# Patient Record
Sex: MALE | Race: WHITE | NOT HISPANIC OR LATINO | Employment: UNEMPLOYED | URBAN - METROPOLITAN AREA
[De-identification: names, ages, dates, MRNs, and addresses within clinical notes are randomized per-mention and may not be internally consistent; named-entity substitution may affect disease eponyms.]

---

## 2023-08-16 ENCOUNTER — APPOINTMENT (INPATIENT)
Dept: NON INVASIVE DIAGNOSTICS | Facility: HOSPITAL | Age: 73
End: 2023-08-16
Payer: COMMERCIAL

## 2023-08-16 ENCOUNTER — APPOINTMENT (EMERGENCY)
Dept: RADIOLOGY | Facility: HOSPITAL | Age: 73
End: 2023-08-16
Payer: COMMERCIAL

## 2023-08-16 ENCOUNTER — HOSPITAL ENCOUNTER (INPATIENT)
Facility: HOSPITAL | Age: 73
LOS: 3 days | Discharge: HOME | End: 2023-08-19
Attending: EMERGENCY MEDICINE | Admitting: INTERNAL MEDICINE
Payer: COMMERCIAL

## 2023-08-16 DIAGNOSIS — I50.9 CHF (CONGESTIVE HEART FAILURE) (HCC): ICD-10-CM

## 2023-08-16 DIAGNOSIS — R09.02 HYPOXIA: Primary | ICD-10-CM

## 2023-08-16 PROBLEM — I10 HTN (HYPERTENSION): Status: ACTIVE | Noted: 2023-08-16

## 2023-08-16 PROBLEM — R77.8 ELEVATED TROPONIN I LEVEL: Status: ACTIVE | Noted: 2023-08-16

## 2023-08-16 PROBLEM — Z95.810 HISTORY OF AUTOMATIC INTERNAL CARDIAC DEFIBRILLATOR (AICD): Status: ACTIVE | Noted: 2023-08-16

## 2023-08-16 PROBLEM — J90 PLEURAL EFFUSION: Status: ACTIVE | Noted: 2023-08-16

## 2023-08-16 LAB
2HR DELTA HS TROPONIN: -15 NG/L
4HR DELTA HS TROPONIN: -12 NG/L
ALBUMIN SERPL BCP-MCNC: 3.5 G/DL (ref 3.5–5)
ALP SERPL-CCNC: 89 U/L (ref 46–116)
ALT SERPL W P-5'-P-CCNC: 47 U/L (ref 12–78)
ANION GAP SERPL CALCULATED.3IONS-SCNC: 4 MMOL/L
ANION GAP SERPL CALCULATED.3IONS-SCNC: 4 MMOL/L
AST SERPL W P-5'-P-CCNC: 57 U/L (ref 5–45)
ATRIAL RATE: 72 BPM
BASOPHILS # BLD AUTO: 0.04 THOUSANDS/ÂΜL (ref 0–0.1)
BASOPHILS NFR BLD AUTO: 1 % (ref 0–1)
BILIRUB SERPL-MCNC: 0.98 MG/DL (ref 0.2–1)
BNP SERPL-MCNC: 813 PG/ML (ref 0–100)
BUN SERPL-MCNC: 21 MG/DL (ref 5–25)
BUN SERPL-MCNC: 25 MG/DL (ref 5–25)
CALCIUM SERPL-MCNC: 8.7 MG/DL (ref 8.3–10.1)
CALCIUM SERPL-MCNC: 9.2 MG/DL (ref 8.3–10.1)
CARDIAC TROPONIN I PNL SERPL HS: 297 NG/L
CARDIAC TROPONIN I PNL SERPL HS: 300 NG/L
CARDIAC TROPONIN I PNL SERPL HS: 312 NG/L
CHLORIDE SERPL-SCNC: 109 MMOL/L (ref 96–108)
CHLORIDE SERPL-SCNC: 112 MMOL/L (ref 96–108)
CO2 SERPL-SCNC: 25 MMOL/L (ref 21–32)
CO2 SERPL-SCNC: 26 MMOL/L (ref 21–32)
CREAT SERPL-MCNC: 0.97 MG/DL (ref 0.6–1.3)
CREAT SERPL-MCNC: 1.11 MG/DL (ref 0.6–1.3)
EOSINOPHIL # BLD AUTO: 0.05 THOUSAND/ÂΜL (ref 0–0.61)
EOSINOPHIL NFR BLD AUTO: 1 % (ref 0–6)
ERYTHROCYTE [DISTWIDTH] IN BLOOD BY AUTOMATED COUNT: 16.2 % (ref 11.6–15.1)
GFR SERPL CREATININE-BSD FRML MDRD: 65 ML/MIN/1.73SQ M
GFR SERPL CREATININE-BSD FRML MDRD: 77 ML/MIN/1.73SQ M
GLUCOSE SERPL-MCNC: 129 MG/DL (ref 65–140)
GLUCOSE SERPL-MCNC: 95 MG/DL (ref 65–140)
HCT VFR BLD AUTO: 42 % (ref 36.5–49.3)
HGB BLD-MCNC: 13.5 G/DL (ref 12–17)
IMM GRANULOCYTES # BLD AUTO: 0.02 THOUSAND/UL (ref 0–0.2)
IMM GRANULOCYTES NFR BLD AUTO: 0 % (ref 0–2)
LYMPHOCYTES # BLD AUTO: 1.17 THOUSANDS/ÂΜL (ref 0.6–4.47)
LYMPHOCYTES NFR BLD AUTO: 19 % (ref 14–44)
MAGNESIUM SERPL-MCNC: 2.5 MG/DL (ref 1.6–2.6)
MCH RBC QN AUTO: 27.1 PG (ref 26.8–34.3)
MCHC RBC AUTO-ENTMCNC: 32.1 G/DL (ref 31.4–37.4)
MCV RBC AUTO: 84 FL (ref 82–98)
MONOCYTES # BLD AUTO: 0.47 THOUSAND/ÂΜL (ref 0.17–1.22)
MONOCYTES NFR BLD AUTO: 8 % (ref 4–12)
NEUTROPHILS # BLD AUTO: 4.39 THOUSANDS/ÂΜL (ref 1.85–7.62)
NEUTS SEG NFR BLD AUTO: 71 % (ref 43–75)
NRBC BLD AUTO-RTO: 0 /100 WBCS
P AXIS: 66 DEGREES
PLATELET # BLD AUTO: 241 THOUSANDS/UL (ref 149–390)
PMV BLD AUTO: 11.1 FL (ref 8.9–12.7)
POTASSIUM SERPL-SCNC: 3.3 MMOL/L (ref 3.5–5.3)
POTASSIUM SERPL-SCNC: 4.8 MMOL/L (ref 3.5–5.3)
PR INTERVAL: 172 MS
PROT SERPL-MCNC: 7.1 G/DL (ref 6.4–8.4)
QRS AXIS: 20 DEGREES
QRSD INTERVAL: 122 MS
QT INTERVAL: 452 MS
QTC INTERVAL: 494 MS
RBC # BLD AUTO: 4.99 MILLION/UL (ref 3.88–5.62)
SODIUM SERPL-SCNC: 138 MMOL/L (ref 135–147)
SODIUM SERPL-SCNC: 142 MMOL/L (ref 135–147)
T WAVE AXIS: -15 DEGREES
VENTRICULAR RATE: 72 BPM
WBC # BLD AUTO: 6.14 THOUSAND/UL (ref 4.31–10.16)

## 2023-08-16 PROCEDURE — 36415 COLL VENOUS BLD VENIPUNCTURE: CPT | Performed by: EMERGENCY MEDICINE

## 2023-08-16 PROCEDURE — 99223 1ST HOSP IP/OBS HIGH 75: CPT | Performed by: INTERNAL MEDICINE

## 2023-08-16 PROCEDURE — 99285 EMERGENCY DEPT VISIT HI MDM: CPT

## 2023-08-16 PROCEDURE — 93005 ELECTROCARDIOGRAM TRACING: CPT

## 2023-08-16 PROCEDURE — 93010 ELECTROCARDIOGRAM REPORT: CPT | Performed by: INTERNAL MEDICINE

## 2023-08-16 PROCEDURE — C8929 TTE W OR WO FOL WCON,DOPPLER: HCPCS

## 2023-08-16 PROCEDURE — 83880 ASSAY OF NATRIURETIC PEPTIDE: CPT | Performed by: NURSE PRACTITIONER

## 2023-08-16 PROCEDURE — 80053 COMPREHEN METABOLIC PANEL: CPT | Performed by: EMERGENCY MEDICINE

## 2023-08-16 PROCEDURE — 71046 X-RAY EXAM CHEST 2 VIEWS: CPT

## 2023-08-16 PROCEDURE — 80048 BASIC METABOLIC PNL TOTAL CA: CPT | Performed by: INTERNAL MEDICINE

## 2023-08-16 PROCEDURE — 96374 THER/PROPH/DIAG INJ IV PUSH: CPT

## 2023-08-16 PROCEDURE — 85025 COMPLETE CBC W/AUTO DIFF WBC: CPT | Performed by: EMERGENCY MEDICINE

## 2023-08-16 PROCEDURE — 84484 ASSAY OF TROPONIN QUANT: CPT | Performed by: EMERGENCY MEDICINE

## 2023-08-16 PROCEDURE — 83735 ASSAY OF MAGNESIUM: CPT | Performed by: EMERGENCY MEDICINE

## 2023-08-16 PROCEDURE — 99285 EMERGENCY DEPT VISIT HI MDM: CPT | Performed by: EMERGENCY MEDICINE

## 2023-08-16 RX ORDER — SPIRONOLACTONE 25 MG/1
25 TABLET ORAL DAILY
COMMUNITY

## 2023-08-16 RX ORDER — METOPROLOL SUCCINATE 25 MG/1
25 TABLET, EXTENDED RELEASE ORAL DAILY
COMMUNITY

## 2023-08-16 RX ORDER — FUROSEMIDE 10 MG/ML
40 INJECTION INTRAMUSCULAR; INTRAVENOUS ONCE
Status: COMPLETED | OUTPATIENT
Start: 2023-08-16 | End: 2023-08-16

## 2023-08-16 RX ORDER — MEXILETINE HYDROCHLORIDE 200 MG/1
200 CAPSULE ORAL 3 TIMES DAILY
COMMUNITY

## 2023-08-16 RX ORDER — ACETAMINOPHEN 325 MG/1
650 TABLET ORAL EVERY 4 HOURS PRN
Status: DISCONTINUED | OUTPATIENT
Start: 2023-08-16 | End: 2023-08-19 | Stop reason: HOSPADM

## 2023-08-16 RX ORDER — ATORVASTATIN CALCIUM 40 MG/1
40 TABLET, FILM COATED ORAL DAILY
COMMUNITY

## 2023-08-16 RX ORDER — ASPIRIN 81 MG/1
81 TABLET, CHEWABLE ORAL DAILY
Status: DISCONTINUED | OUTPATIENT
Start: 2023-08-17 | End: 2023-08-19 | Stop reason: HOSPADM

## 2023-08-16 RX ORDER — ENOXAPARIN SODIUM 100 MG/ML
40 INJECTION SUBCUTANEOUS DAILY
Status: DISCONTINUED | OUTPATIENT
Start: 2023-08-16 | End: 2023-08-19 | Stop reason: HOSPADM

## 2023-08-16 RX ORDER — MEXILETINE HYDROCHLORIDE 200 MG/1
200 CAPSULE ORAL 3 TIMES DAILY
Status: DISCONTINUED | OUTPATIENT
Start: 2023-08-16 | End: 2023-08-16

## 2023-08-16 RX ORDER — METOPROLOL SUCCINATE 25 MG/1
25 TABLET, EXTENDED RELEASE ORAL DAILY
Status: DISCONTINUED | OUTPATIENT
Start: 2023-08-17 | End: 2023-08-19 | Stop reason: HOSPADM

## 2023-08-16 RX ORDER — LISINOPRIL 10 MG/1
10 TABLET ORAL DAILY
COMMUNITY

## 2023-08-16 RX ORDER — ONDANSETRON 2 MG/ML
4 INJECTION INTRAMUSCULAR; INTRAVENOUS EVERY 6 HOURS PRN
Status: DISCONTINUED | OUTPATIENT
Start: 2023-08-16 | End: 2023-08-19 | Stop reason: HOSPADM

## 2023-08-16 RX ORDER — OMEPRAZOLE 40 MG/1
40 CAPSULE, DELAYED RELEASE ORAL DAILY
COMMUNITY

## 2023-08-16 RX ORDER — PANTOPRAZOLE SODIUM 40 MG/1
40 TABLET, DELAYED RELEASE ORAL
Status: DISCONTINUED | OUTPATIENT
Start: 2023-08-17 | End: 2023-08-19 | Stop reason: HOSPADM

## 2023-08-16 RX ORDER — SPIRONOLACTONE 25 MG/1
25 TABLET ORAL DAILY
Status: DISCONTINUED | OUTPATIENT
Start: 2023-08-17 | End: 2023-08-19 | Stop reason: HOSPADM

## 2023-08-16 RX ORDER — ATORVASTATIN CALCIUM 40 MG/1
40 TABLET, FILM COATED ORAL DAILY
Status: DISCONTINUED | OUTPATIENT
Start: 2023-08-17 | End: 2023-08-19 | Stop reason: HOSPADM

## 2023-08-16 RX ORDER — ASPIRIN 81 MG/1
81 TABLET, CHEWABLE ORAL DAILY
COMMUNITY

## 2023-08-16 RX ORDER — AMIODARONE HYDROCHLORIDE 200 MG/1
200 TABLET ORAL 2 TIMES DAILY
COMMUNITY

## 2023-08-16 RX ORDER — LISINOPRIL 10 MG/1
10 TABLET ORAL DAILY
Status: DISCONTINUED | OUTPATIENT
Start: 2023-08-17 | End: 2023-08-18

## 2023-08-16 RX ORDER — POTASSIUM CHLORIDE 20 MEQ/1
40 TABLET, EXTENDED RELEASE ORAL ONCE
Status: COMPLETED | OUTPATIENT
Start: 2023-08-16 | End: 2023-08-16

## 2023-08-16 RX ORDER — POTASSIUM CHLORIDE 20 MEQ/1
20 TABLET, EXTENDED RELEASE ORAL DAILY
Status: DISCONTINUED | OUTPATIENT
Start: 2023-08-16 | End: 2023-08-17

## 2023-08-16 RX ORDER — MEXILETINE HYDROCHLORIDE 250 MG/1
250 CAPSULE ORAL EVERY 12 HOURS SCHEDULED
Status: DISCONTINUED | OUTPATIENT
Start: 2023-08-16 | End: 2023-08-19 | Stop reason: HOSPADM

## 2023-08-16 RX ORDER — CALCIUM CARBONATE 500 MG/1
1000 TABLET, CHEWABLE ORAL DAILY PRN
Status: DISCONTINUED | OUTPATIENT
Start: 2023-08-16 | End: 2023-08-19 | Stop reason: HOSPADM

## 2023-08-16 RX ORDER — FUROSEMIDE 10 MG/ML
40 INJECTION INTRAMUSCULAR; INTRAVENOUS 2 TIMES DAILY
Status: DISCONTINUED | OUTPATIENT
Start: 2023-08-16 | End: 2023-08-19

## 2023-08-16 RX ORDER — AMIODARONE HYDROCHLORIDE 200 MG/1
200 TABLET ORAL 2 TIMES DAILY
Status: DISCONTINUED | OUTPATIENT
Start: 2023-08-16 | End: 2023-08-19 | Stop reason: HOSPADM

## 2023-08-16 RX ADMIN — POTASSIUM CHLORIDE 20 MEQ: 1500 TABLET, EXTENDED RELEASE ORAL at 15:33

## 2023-08-16 RX ADMIN — POTASSIUM CHLORIDE 40 MEQ: 1500 TABLET, EXTENDED RELEASE ORAL at 20:14

## 2023-08-16 RX ADMIN — ENOXAPARIN SODIUM 40 MG: 40 INJECTION SUBCUTANEOUS at 17:45

## 2023-08-16 RX ADMIN — PERFLUTREN 0.6 ML/MIN: 6.52 INJECTION, SUSPENSION INTRAVENOUS at 16:14

## 2023-08-16 RX ADMIN — MEXILETINE HYDROCHLORIDE 250 MG: 250 CAPSULE ORAL at 20:14

## 2023-08-16 RX ADMIN — AMIODARONE HYDROCHLORIDE 200 MG: 200 TABLET ORAL at 18:06

## 2023-08-16 RX ADMIN — TICAGRELOR 90 MG: 90 TABLET ORAL at 21:08

## 2023-08-16 RX ADMIN — FUROSEMIDE 40 MG: 10 INJECTION, SOLUTION INTRAMUSCULAR; INTRAVENOUS at 13:01

## 2023-08-16 NOTE — ASSESSMENT & PLAN NOTE
· Elevated in the ED, likely due to volume overload  · Monitor BP with diuresis  · Query sent to [facility], [facility] and his PCP for records, currently pending  · Called his [facility] here in [facility], apparently they do no have him in their records per the pharmacust

## 2023-08-16 NOTE — ED NOTES
Patient [employer] ID card placed back in patient's wallet.      [facility]  08/16/23 1187

## 2023-08-16 NOTE — ED NOTES
Called [employer]. They will have the local rep call back to set up time for in person interrogation of device.      Tao Mercer  08/16/23 2092

## 2023-08-16 NOTE — ED PROVIDER NOTES
History  Chief Complaint   Patient presents with   • Shortness of Breath     Pt states "I woke up 10 times last night because I couldn't breathe." Has pacemaker and believes its from that.      69-year-old male with history of prior MI, CHF with reduced EF with AICD in place presents after episodes of dyspnea last night that interrupted him from sleep. Symptoms worse when lying flat and with exertion. Denies chest pain, cough, recent illnesses, leg swelling, or GI symptoms. Prior to Admission Medications   Prescriptions Last Dose Informant Patient Reported? Taking?   amiodarone 200 mg tablet   Yes Yes   Sig: Take 200 mg by mouth 2 (two) times a day   aspirin 81 mg chewable tablet   Yes Yes   Sig: Chew 81 mg daily   atorvastatin (LIPITOR) 40 mg tablet   Yes Yes   Sig: Take 40 mg by mouth daily   dapagliflozin (Farxiga) 10 MG tablet   Yes Yes   Sig: Take 10 mg by mouth daily   lisinopril (ZESTRIL) 10 mg tablet   Yes Yes   Sig: Take 10 mg by mouth daily   metoprolol succinate (TOPROL-XL) 25 mg 24 hr tablet   Yes Yes   Sig: Take 25 mg by mouth daily   mexiletine (MEXITIL) 200 MG capsule   Yes Yes   Sig: Take 200 mg by mouth 3 (three) times a day   omeprazole (PriLOSEC) 40 MG capsule   Yes Yes   Sig: Take 40 mg by mouth daily   spironolactone (ALDACTONE) 25 mg tablet   Yes Yes   Sig: Take 25 mg by mouth daily   ticagrelor (Brilinta) 90 MG   Yes Yes   Sig: Take 90 mg by mouth every 12 (twelve) hours      Facility-Administered Medications: None       Past Medical History:   Diagnosis Date   • MI (myocardial infarction) ([address]) 2022       History reviewed. No pertinent surgical history. History reviewed. No pertinent family history. I have reviewed and agree with the history as documented.     E-Cigarette/Vaping     E-Cigarette/Vaping Substances     Social History     Tobacco Use   • Smoking status: Never   • Smokeless tobacco: Never   Substance Use Topics   • Alcohol use: Never   • Drug use: Never        Review of Systems   Constitutional: Negative for chills and fever. HENT: Negative for ear pain and sore throat. Eyes: Negative for pain and visual disturbance. Respiratory: Positive for shortness of breath. Negative for cough. Cardiovascular: Negative for chest pain and palpitations. Gastrointestinal: Negative for abdominal pain and vomiting. Genitourinary: Negative for dysuria and hematuria. Musculoskeletal: Negative for arthralgias and back pain. Skin: Negative for color change and rash. Neurological: Negative for seizures and syncope. All other systems reviewed and are negative. Physical Exam  ED Triage Vitals [08/16/23 1110]   Temperature Pulse Respirations Blood Pressure SpO2   (!) 97.2 °F (36.2 °C) 71 20 (!) 158/109 93 %      Temp Source Heart Rate Source Patient Position - Orthostatic VS BP Location FiO2 (%)   Temporal Monitor Lying Right arm --      Pain Score       No Pain             Orthostatic Vital Signs  Vitals:    08/18/23 2309 08/19/23 0242 08/19/23 0747 08/19/23 1107   BP: 143/87 143/85 143/85 144/86   Pulse: 60 58 61 60   Patient Position - Orthostatic VS:   Lying        Physical Exam  Vitals and nursing note reviewed. Constitutional:       General: He is not in acute distress. Appearance: He is well-developed and normal weight. He is not ill-appearing, toxic-appearing or diaphoretic. HENT:      Head: Normocephalic and atraumatic. Eyes:      Conjunctiva/sclera: Conjunctivae normal.   Neck:      Vascular: No hepatojugular reflux or JVD. Cardiovascular:      Rate and Rhythm: Normal rate and regular rhythm. Heart sounds: No murmur heard. Pulmonary:      Effort: Pulmonary effort is normal. No respiratory distress. Breath sounds: Rales present. No decreased breath sounds or wheezing. Abdominal:      Palpations: Abdomen is soft. Tenderness: There is no abdominal tenderness. Musculoskeletal:         General: No swelling.       Cervical back: Normal range of motion and neck supple. Right lower leg: No tenderness. No edema. Left lower leg: No tenderness. Edema present. Comments: Nonpitting edema left lower leg extending from foot to knee   Skin:     General: Skin is warm and dry. Capillary Refill: Capillary refill takes less than 2 seconds. Neurological:      Mental Status: He is alert and oriented to person, place, and time.    Psychiatric:         Mood and Affect: Mood normal.         Behavior: Behavior normal.         ED Medications  Medications   furosemide (LASIX) injection 40 mg (40 mg Intravenous Given 8/16/23 1301)   perflutren lipid microsphere (DEFINITY) injection (0.6 mL/min Intravenous Given 8/16/23 1614)   potassium chloride (K-DUR,KLOR-CON) CR tablet 40 mEq (40 mEq Oral Given 8/16/23 2014)   potassium chloride (K-DUR,KLOR-CON) CR tablet 20 mEq (20 mEq Oral Given 8/17/23 1246)   potassium chloride (K-DUR,KLOR-CON) CR tablet 20 mEq (20 mEq Oral Given 8/18/23 1320)   lisinopril (ZESTRIL) tablet 10 mg (10 mg Oral Given 8/18/23 1320)       Diagnostic Studies  Results Reviewed     Procedure Component Value Units Date/Time    Basic metabolic panel [[id number]]  (Abnormal) Collected: 08/17/23 0507    Lab Status: Final result Specimen: Blood from Arm, Right Updated: 08/17/23 0610     Sodium 142 mmol/L      Potassium 3.4 mmol/L      Chloride 109 mmol/L      CO2 29 mmol/L      ANION GAP 4 mmol/L      BUN 21 mg/dL      Creatinine 0.93 mg/dL      Glucose 95 mg/dL      Calcium 8.7 mg/dL      eGFR 81 ml/min/1.73sq m     Narrative:      [place] guidelines for Chronic Kidney Disease (CKD):   •  Stage 1 with normal or high GFR (GFR > 90 mL/min/1.73 square meters)  •  Stage 2 Mild CKD (GFR = 60-89 mL/min/1.73 square meters)  •  Stage 3A Moderate CKD (GFR = 45-59 mL/min/1.73 square meters)  •  Stage 3B Moderate CKD (GFR = 30-44 mL/min/1.73 square meters)  •  Stage 4 Severe CKD (GFR = 15-29 mL/min/1.73 square meters)  •  Stage 5 End Stage CKD (GFR <15 mL/min/1.73 square meters)  Note: GFR calculation is accurate only with a steady state creatinine    CBC (With Platelets) [[id number]]  (Abnormal) Collected: 08/17/23 0507    Lab Status: Final result Specimen: Blood from Arm, Right Updated: 08/17/23 0549     WBC 5.19 Thousand/uL      RBC 4.93 Million/uL      Hemoglobin 13.0 g/dL      Hematocrit 41.3 %      MCV 84 fL      MCH 26.4 pg      MCHC 31.5 g/dL      RDW 16.1 %      Platelets 521 Thousands/uL      MPV 9.8 fL     B-Type Natriuretic Peptide(BNP) [[id number]]  (Abnormal) Collected: 08/16/23 1755    Lab Status: Final result Specimen: Blood from Arm, Left Updated: 08/16/23 1827      pg/mL     Basic metabolic panel [[id number]]  (Abnormal) Collected: 08/16/23 1755    Lab Status: Final result Specimen: Blood from Arm, Left Updated: 08/16/23 1821     Sodium 142 mmol/L      Potassium 3.3 mmol/L      Chloride 112 mmol/L      CO2 26 mmol/L      ANION GAP 4 mmol/L      BUN 21 mg/dL      Creatinine 0.97 mg/dL      Glucose 129 mg/dL      Calcium 8.7 mg/dL      eGFR 77 ml/min/1.73sq m     Narrative:      [place] guidelines for Chronic Kidney Disease (CKD):   •  Stage 1 with normal or high GFR (GFR > 90 mL/min/1.73 square meters)  •  Stage 2 Mild CKD (GFR = 60-89 mL/min/1.73 square meters)  •  Stage 3A Moderate CKD (GFR = 45-59 mL/min/1.73 square meters)  •  Stage 3B Moderate CKD (GFR = 30-44 mL/min/1.73 square meters)  •  Stage 4 Severe CKD (GFR = 15-29 mL/min/1.73 square meters)  •  Stage 5 End Stage CKD (GFR <15 mL/min/1.73 square meters)  Note: GFR calculation is accurate only with a steady state creatinine    HS Troponin I 4hr [[id number]]  (Abnormal) Collected: 08/16/23 1533    Lab Status: Final result Specimen: Blood from Arm, Left Updated: 08/16/23 1612     hs TnI 4hr 300 ng/L      Delta 4hr hsTnI -12 ng/L     HS Troponin I 2hr [[id number]]  (Abnormal) Collected: 08/16/23 1339    Lab Status: Final result Specimen: Blood from Arm, Left Updated: 08/16/23 1418     hs TnI 2hr 297 ng/L      Delta 2hr hsTnI -15 ng/L     Magnesium [[id number]]  (Normal) Collected: 08/16/23 1134    Lab Status: Final result Specimen: Blood from Arm, Left Updated: 08/16/23 1221     Magnesium 2.5 mg/dL     HS Troponin 0hr (reflex protocol) [[id number]]  (Abnormal) Collected: 08/16/23 1134    Lab Status: Final result Specimen: Blood from Arm, Left Updated: 08/16/23 1216     hs TnI 0hr 312 ng/L     Comprehensive metabolic panel [[id number]]  (Abnormal) Collected: 08/16/23 1134    Lab Status: Final result Specimen: Blood from Arm, Left Updated: 08/16/23 1206     Sodium 138 mmol/L      Potassium 4.8 mmol/L      Chloride 109 mmol/L      CO2 25 mmol/L      ANION GAP 4 mmol/L      BUN 25 mg/dL      Creatinine 1.11 mg/dL      Glucose 95 mg/dL      Calcium 9.2 mg/dL      AST 57 U/L      ALT 47 U/L      Alkaline Phosphatase 89 U/L      Total Protein 7.1 g/dL      Albumin 3.5 g/dL      Total Bilirubin 0.98 mg/dL      eGFR 65 ml/min/1.73sq m     Narrative:      [place] guidelines for Chronic Kidney Disease (CKD):   •  Stage 1 with normal or high GFR (GFR > 90 mL/min/1.73 square meters)  •  Stage 2 Mild CKD (GFR = 60-89 mL/min/1.73 square meters)  •  Stage 3A Moderate CKD (GFR = 45-59 mL/min/1.73 square meters)  •  Stage 3B Moderate CKD (GFR = 30-44 mL/min/1.73 square meters)  •  Stage 4 Severe CKD (GFR = 15-29 mL/min/1.73 square meters)  •  Stage 5 End Stage CKD (GFR <15 mL/min/1.73 square meters)  Note: GFR calculation is accurate only with a steady state creatinine    CBC and differential [[id number]]  (Abnormal) Collected: 08/16/23 1134    Lab Status: Final result Specimen: Blood from Arm, Left Updated: 08/16/23 1154     WBC 6.14 Thousand/uL      RBC 4.99 Million/uL      Hemoglobin 13.5 g/dL      Hematocrit 42.0 %      MCV 84 fL      MCH 27.1 pg      MCHC 32.1 g/dL      RDW 16.2 %      MPV 11.1 fL      Platelets 499 Thousands/uL      nRBC 0 /100 WBCs      Neutrophils Relative 71 %      Immat GRANS % 0 %      Lymphocytes Relative 19 %      Monocytes Relative 8 %      Eosinophils Relative 1 %      Basophils Relative 1 %      Neutrophils Absolute 4.39 Thousands/µL      Immature Grans Absolute 0.02 Thousand/uL      Lymphocytes Absolute 1.17 Thousands/µL      Monocytes Absolute 0.47 Thousand/µL      Eosinophils Absolute 0.05 Thousand/µL      Basophils Absolute 0.04 Thousands/µL                  XR chest 2 views   Final Result by Og Montaño MD (08/16 1400)      Pulmonary edema with trace bilateral pleural effusions. ICD leads are intact. Resident: Papa Dewey, the attending radiologist, have reviewed the images and agree with the final report above.       Workstation performed: [id number]               Procedures  ECG 12 Lead Documentation Only    Date/Time: 8/16/2023 9:21 PM    Performed by: Davonte Hollingsworth MD  Authorized by: Davonte Hollingsworth MD    Indications / Diagnosis:  Dyspnea  ECG reviewed by me, the ED Provider: yes    Patient location:  ED  Previous ECG:     Previous ECG:  Unavailable    Comparison to cardiac monitor: Yes    Interpretation:     Interpretation: non-specific    Rate:     ECG rate:  72    ECG rate assessment: normal    Rhythm:     Rhythm: sinus rhythm    Ectopy:     Ectopy: none    QRS:     QRS axis:  Normal    QRS intervals:  Normal  Conduction:     Conduction: normal    ST segments:     ST segments:  Normal  T waves:     T waves: non-specific    POC Cardiac US    Date/Time: 8/16/2023 9:00 PM    Performed by: Davonte Hollingsworth MD  Authorized by: Davonte Hollingsworth MD    Patient location:  ED  Other Assisting Provider: No    Procedure details:     Exam Type:  Diagnostic    Indications: suspected volume depletion and dyspnea      Assessment / Evaluation for: cardiac function, pericardial effusion and intravascular volume status      Exam Type: initial exam      Image quality: diagnostic Image availability:  Video obtained  Patient Details:     Cardiac Rhythm:  Regular    Mechanical ventilation: No    Cardiac findings:     Echo technique: limited 2D      Views obtained: parasternal long axis, parasternal short axis, subcostal and apical      Pericardial effusion: absent      Tamponade physiology: absent      Wall motion: normal      LV systolic function: depressed      RV dilation: none    Pulmonary findings:     Left Lung Findings: left pleural effusion present and left lung sliding      Right lung findings: right pleural effusion present and right lung sliding      B-lines: more than 3    IVC findings:     IVC Size: small      IVC Inspiratory Collapse: normal    Interpretation:     Fluid Status:  Euvolemic          ED Course                                       Medical Decision Making  76-year-old male with history of CHF with presentation suggestive of acute on chronic heart failure. No findings on history exam to suggest acute infection, PE, ACS, or pneumothorax. Plan: Cardiac labs, bedside echo, Lasix, admit to [facility]    CHF (congestive heart failure) ([address]): chronic illness or injury  Hypoxia: acute illness or injury  Amount and/or Complexity of Data Reviewed  Labs: ordered. Radiology: ordered. ECG/medicine tests: ordered and independent interpretation performed. Risk  Prescription drug management. Decision regarding hospitalization.             Disposition  Final diagnoses:   Hypoxia   CHF (congestive heart failure) ([address])     Time reflects when diagnosis was documented in both MDM as applicable and the Disposition within this note     Time User Action Codes Description Comment    8/16/2023  1:26 PM Nacho Moreira [R09.02] Hypoxia     8/16/2023  1:26 PM Florentin Grove [I50.9] CHF (congestive heart failure) [facility])       ED Disposition     ED Disposition   Admit    Condition   Stable    Date/Time   Wed Aug 16, 2023  1:26 PM    Comment   Case was discussed with TREVON and the patient's admission status was agreed to be Admission Status: inpatient status to the service of Dr. Sergio Avery . Follow-up Information     Follow up With Specialties Details Why [facility] Follow up  [place]  [phone]      Cardiology  Follow up Follow up with your Cardiologist in 3-5 days. Have blood work done in 1 week. Discharge Medication List as of 8/19/2023  1:49 PM      START taking these medications    Details   furosemide (LASIX) 40 mg tablet Take 1 tablet (40 mg total) by mouth daily Do not start before August 20, 2023., Starting Sun 8/20/2023, Normal         CONTINUE these medications which have NOT CHANGED    Details   amiodarone 200 mg tablet Take 200 mg by mouth 2 (two) times a day, Historical Med      aspirin 81 mg chewable tablet Chew 81 mg daily, Historical Med      atorvastatin (LIPITOR) 40 mg tablet Take 40 mg by mouth daily, Historical Med      dapagliflozin (Farxiga) 10 MG tablet Take 10 mg by mouth daily, Historical Med      lisinopril (ZESTRIL) 10 mg tablet Take 10 mg by mouth daily, Historical Med      metoprolol succinate (TOPROL-XL) 25 mg 24 hr tablet Take 25 mg by mouth daily, Historical Med      mexiletine (MEXITIL) 200 MG capsule Take 200 mg by mouth 3 (three) times a day, Historical Med      omeprazole (PriLOSEC) 40 MG capsule Take 40 mg by mouth daily, Historical Med      spironolactone (ALDACTONE) 25 mg tablet Take 25 mg by mouth daily, Historical Med      ticagrelor (Brilinta) 90 MG Take 90 mg by mouth every 12 (twelve) hours, Historical Med           Outpatient Discharge Orders   Discharge Diet     Activity as tolerated       PDMP Review     None           ED Provider  Attending physically available and evaluated Miranda Chiu. I managed the patient along with the ED Attending.     Electronically Signed by         Malinda Mesa MD  08/23/23 Jimmy Hodge Fortino Trivedi MD  08/23/23 8954

## 2023-08-16 NOTE — H&P
[address] [facility]  H&P  Name: Florentino Patel 67 y.o. male I MRN: [id number]  Unit/Bed#: ED 13 I Date of Admission: 8/16/2023   Date of Service: 8/16/2023 I Hospital Day: 0      Assessment/Plan   * Acute heart failure ([address])  Assessment & Plan  Wt Readings from Last 3 Encounters:   No data found for Wt     · Presented to the ED today after experiencing shortness of breath last night with orthopnea. · Here in the ED he was found to have slightly elevated troponin, pulmonary congestion on CXR and lower extremity edema. · Suspect acute on chronic HF but his history is unknown at this time. · [address] [place] [address],[address] queried on care everywhere, awaiting data  · Check echo  · Trend troponin  · PT is diuresing well after administration of lasix 40mg in the ED, will continue lasix 40mg BID  · Monitor on telemetry  · Daily weights and BMP  · Intake and output  · Low Na diet  · Consult cardiology for assistance with management        Elevated troponin I level  Assessment & Plan  · troponins are elevated but flat so far  · Possible Non MI troponin elevation due to acute CHF  · Await 4hr troponin level    HTN (hypertension)  Assessment & Plan  · Elevated in the ED, likely due to volume overload  · Monitor BP with diuresis  · Query sent to [address] [place] [address],[address] and his PCP for records, currently pending  · Called his [facility] pharmacy here in [place], apparently they do no have him in their records per the pharmacust    History of automatic internal cardiac defibrillator (AICD)  Assessment & Plan  · Interrogated in the ED, no events per report    Pleural effusion  Assessment & Plan  · Small pleural effusion on the right. · Likely due to CHF  · Monitor for now with diuresis         VTE Pharmacologic Prophylaxis:   Moderate Risk (Score 3-4) - Pharmacological DVT Prophylaxis Ordered: enoxaparin (Lovenox).   Code Status: Level 1 - Full Code   Discussion with family: Patient declined call to . Anticipated Length of Stay: Patient will be admitted on an inpatient basis with an anticipated length of stay of greater than 2 midnights secondary to CHF. Total Time Spent on Date of Encounter in care of patient: 55 minutes This time was spent on one or more of the following: performing physical exam; counseling and coordination of care; obtaining or reviewing history; documenting in the medical record; reviewing/ordering tests, medications or procedures; communicating with other healthcare professionals and discussing with patient's family/caregivers. Chief Complaint: shortness of breath    History of Present Illness:  Kim Jordan is a 67 y.o. male with a PMH of AICD implantation who presents with shortness of breath. He is currently visitng here from [place] so records and limited and his knowledge of his past medical history of also limited. He reports not being able to sleep last night due to shortness of breath which is worse when lying down. He also reports edema of his legs which is worse when his legs are down. He presented to the ED today and was noted to be volume overloaded and referred to [facility] for admission. Since receiving his first dose of lasix he reports feeling better and is diuresing adequately. Review of Systems:  Review of Systems   All other systems reviewed and are negative. Past Medical and Surgical History:   Past Medical History:   Diagnosis Date   • MI (myocardial infarction) ([address]) 2022       History reviewed. No pertinent surgical history. Meds/Allergies:  Prior to Admission medications    Not on File     unable to verify so far, [facility] pharmacy said he is not a patient of his. Called his PCP office who said they will fax records. .     Allergies: No Known Allergies    Social History:  Marital Status: Single   Occupation: retired  Patient Pre-hospital Living Situation: Home  Patient Pre-hospital Level of Mobility: walks  Patient Pre-hospital Diet Restrictions: None  Substance Use History:   Social History     Substance and Sexual Activity   Alcohol Use Never     Social History     Tobacco Use   Smoking Status Never   Smokeless Tobacco Never     Social History     Substance and Sexual Activity   Drug Use Never       Family History:  History reviewed. No pertinent family history. Physical Exam:     Vitals:   Blood Pressure: 145/90 (08/16/23 1400)  Pulse: 62 (08/16/23 1400)  Temperature: (!) 97.2 °F (36.2 °C) (08/16/23 1110)  Temp Source: Temporal (08/16/23 1110)  Respirations: 22 (08/16/23 1400)  SpO2: 95 % (08/16/23 1400)    Physical Exam  Constitutional:       General: He is not in acute distress. Appearance: Normal appearance. He is not toxic-appearing. HENT:      Head: Normocephalic and atraumatic. Nose: Nose normal.   Eyes:      Extraocular Movements: Extraocular movements intact. Cardiovascular:      Rate and Rhythm: Normal rate and regular rhythm. Pulmonary:      Effort: Respiratory distress present. Breath sounds: Rales present. Abdominal:      General: There is no distension. Palpations: Abdomen is soft. Tenderness: There is no abdominal tenderness. Musculoskeletal:      Right lower leg: Edema present. Left lower leg: Edema present. Skin:     General: Skin is warm and dry. Neurological:      Mental Status: He is alert and oriented to person, place, and time.    Psychiatric:         Mood and Affect: Mood normal.         Behavior: Behavior normal.          Additional Data:     Lab Results:  Results from last 7 days   Lab Units 08/16/23  1134   WBC Thousand/uL 6.14   HEMOGLOBIN g/dL 13.5   HEMATOCRIT % 42.0   PLATELETS Thousands/uL 241   NEUTROS PCT % 71   LYMPHS PCT % 19   MONOS PCT % 8   EOS PCT % 1     Results from last 7 days   Lab Units 08/16/23  1134   SODIUM mmol/L 138   POTASSIUM mmol/L 4.8   CHLORIDE mmol/L 109*   CO2 mmol/L 25   BUN mg/dL 25   CREATININE mg/dL 1.11   ANION GAP mmol/L 4   CALCIUM mg/dL 9.2   ALBUMIN g/dL 3.5   TOTAL BILIRUBIN mg/dL 0.98   ALK PHOS U/L 89   ALT U/L 47   AST U/L 57*   GLUCOSE RANDOM mg/dL 95                       Lines/Drains:  Invasive Devices     Peripheral Intravenous Line  Duration           Peripheral IV 08/16/23 Left;Ventral (anterior) Forearm <1 day                    Imaging: Personally reviewed the following imaging: chest xray  XR chest 2 views   Final Result by Og Montaño MD (08/16 1400)      Pulmonary edema with trace bilateral pleural effusions. ICD leads are intact. Resident: Papa Dewey, the attending radiologist, have reviewed the images and agree with the final report above. Workstation performed: [id number]             EKG and Other Studies Reviewed on Admission:   · EKG: NSR. HR 72.    ** Please Note: This note has been constructed using a voice recognition system.  **

## 2023-08-16 NOTE — ED PROCEDURE NOTE
Procedure  POC Cardiac US    Date/Time: 8/16/2023 11:46 AM    Performed by: Bishnu Marcelo MD  Authorized by: Bishnu Marcelo MD    Patient location:  ED  Other Assisting Provider: Yes (comment)    Procedure details:     Exam Type:  Educational    Indications: suspected volume depletion and dyspnea      Assessment / Evaluation for: cardiac function, pericardial effusion and intravascular volume status      Exam Type: initial exam      Image quality: limited diagnostic      Image availability:  Images available in PACS  Cardiac findings:     Echo technique: limited 2D and color flow Doppler      Views obtained: parasternal long axis, parasternal short axis, subcostal and apical      Pericardial effusion: absent      Tamponade physiology: absent      Wall motion: hypodynamic      LV systolic function: depressed    Pulmonary findings:     Left Lung Findings: left pleural effusion present      Right lung findings: right pleural effusion present      B-lines: more than 3    IVC findings:     IVC Size: dilated                       Bishnu Marcelo MD  08/16/23 1149

## 2023-08-16 NOTE — ASSESSMENT & PLAN NOTE
· troponins are elevated but flat so far  · Possible Non MI troponin elevation due to acute CHF  · Await 4hr troponin level

## 2023-08-16 NOTE — ED ATTENDING ATTESTATION
Final Diagnoses:     1. Hypoxia    2. CHF (congestive heart failure) [facility])      ED Course as of 08/16/23 1432   Wed Aug 16, 2023   1142 POCUS Cardiac/IVC + POCUS Lung:  - transthoracic echocardiogram was performed at bedside by myself and resident. - Images collected were adequate quality.   - This was a technically difficult study due to lung interference. - Apical, parasternal, subcostal views were obtained/attempted. - The main purpose of this study was to r/o obvious pathology requiring emergent intervention as in summary.   - Many views/images obtained for educational reasons.   - Findings: There was no obvious pericardial effusion:   LV function reduced (<30%)    EPSS >1cm    MAPSE reduced     Large LV chamber size    RV function reduced. IVC was IVC < 2.1cm; <50% compression w/ sniff likely RAP 8 mmHg     IVC Max: 1.9     IVC Min: 1.8     CI: 10%    TAPSE reduced     TRmax: 36 mmHg    sPAP (TRmax PG + RAP) = 36+8=44 mmHg. sPAP > 40; mild pulmonary hypertension   Lungs: There was bilateral small pleural effusions    B-lines were present anteriorly bilaterally at upper BLUE-point (3+ B-lines in 2+ fields w/ concern for HF/Cardiogenic pulmonary edema sensitivity 85-94%, specificity 92% LR+ 7.4-12; LR- 0.06-0.16)    Bilateral anterior lung sliding bilaterally present at upper BLUE-point (no pneumothorax, sen 91%; spec 98%)   Valves: There was no MR regurgitation. There was no TR regurgitation. There was LA dilation. There was RA dilation. Summary:   - +bilateral pleural effusion with B-lines and a non-distensible IVC  - There was no obvious anterior pneumothorax  - There was no large pericardial effusion.   - no target for thoracentesis. 1236 hs TnI 0hr(!): 312       I, Odilia Oliva MD, saw and evaluated the patient. All available labs and X-rays were ordered by me or the resident / non-physician and have been reviewed by myself.  I discussed the patient with the resident / non-physician and agree with the resident's / non-physician practitioner's findings and plan as documented in the resident's / non-physician practicitioner's note, except where noted. At this point, I agree with the current assessment done in the ED. I was present during key portions of all procedures performed unless otherwise stated. Nursing Triage:     Chief Complaint   Patient presents with   • Shortness of Breath     Pt states "I woke up 10 times last night because I couldn't breathe." Has pacemaker and believes its from that. HPI:   This is a 67 y.o. male presenting for evaluation of CP. He woke up a bunch of times and felt SOB. He has a hx of heart attack that felt like "boom" that might be similar? Compliant with meds  Has been taking meds for 3-4 months but not currently on anything. Has a pacemaker and thinks the pain might be related. He had wine 3 months ago and then went to the floor and thought it caused issues with his pacemaker? ASSESSMENT + PLAN:     - Given patient's concerns, will do a cardiac workup. - Will do an EKG for arrythmia, strain; troponin for same as per protocol for evaluation of ACS. - CBC for anemia; CMP for kidney function and electrolytes. - Will check CXR for pneumonia, PTX, fluid overload  - Will do POCUS Cardiac to evaluate for pericardial effusion. HEART score:  History 0=Slightly or non-suspicious   ECG 1=Nonspecific repolarization disturbance   Age 2= > 65 years   Risk Factors 2= > 3 risk factors, or history of atherosclerotic disease   Troponin 2=Greater than or equal to 36 ng/L   Total 7   - Disposition per workup. Past Medical History:   Diagnosis Date   • MI (myocardial infarction) ([address]) 2022     Physical:   General: VSS, NAD, awake, alert. Well-nourished, well-developed. Appears stated age. Head: Normocephalic, atraumatic, nontender. Eyes: PERRL, EOM-I. No diplopia. No hyphema. No subconjunctival hemorrhages.   Symmetrical lids.   ENTAtraumatic external nose and ears. MMM  No stridor. Normal phonation. No drooling. Base of mouth is soft. No mastoid tenderness. Neck: Symmetric, trachea midline. No JVD. CV: Peripheral pulses +2 throughout. LEFT chest wall tenderness. Lungs:   Unlabored   No retractions  No crepitus. No tachypnea. No paradoxical motion. Abd: +BS, soft, NT/ND.   MSK:   FROM   Back:   No CVAT  Skin: Dry, intact. Neuro: AAOx3, GCS 15, CN II-XII grossly intact. Motor grossly intact. Psychiatric/Behavioral: Appropriate mood and affect   Exam: deferred    Vitals:    08/16/23 1110 08/16/23 1130 08/16/23 1200 08/16/23 1400   BP: (!) 158/109 153/96 146/83 145/90   BP Location: Right arm Right arm Right arm Right arm   Pulse: 71 70 70 62   Resp: 20 20 18 22   Temp: (!) 97.2 °F (36.2 °C)      TempSrc: Temporal      SpO2: 93% 92% 93% 95%     - There are no obvious limitations to social determinants of care. - Nursing note reviewed. - Vitals reviewed. - Orders placed by myself and/or advanced practitioner / resident.    - Previous chart was reviewed  - No language barrier.   - History obtained from patient. - There are no limitations to the history obtained:     Critical Care Time:   - Critical care time: 31 minutes  - Critical care time was exclusive of seperately bilable procedures and treating other patients as well as teaching time.    - Critical care was necessary to treat or prevent imminent or life-threatening deterioration of the following condition: hypoxia  - Critical care time was spent personally by me on the following activities as well as the above as per the ED course and rest of chart: blood draw for specimens, obtaining history from patient / surrogate, developement of a treatment plan, discussions with consultants, evaluation of patient's response to the treatment, examination of the patient, ordering/performing treatements and interventions, re-evaluation of the patient's condition, review of old charts, ordering/reviewing laboratory studies and ordering/reviewing of radiographic studies    Past Medical:    has a past medical history of MI (myocardial infarction) ([address]) (2022). Past Surgical:    has no past surgical history on file. Social:     Social History     Substance and Sexual Activity   Alcohol Use Never     Social History     Tobacco Use   Smoking Status Never   Smokeless Tobacco Never     Social History     Substance and Sexual Activity   Drug Use Never       Echo:   No results found for this or any previous visit. No results found for this or any previous visit. Cath:    No results found for this or any previous visit. Code Status: No Order  Advance Directive and Living Will:      Power of :    POLST:    Medications   furosemide (LASIX) injection 40 mg (40 mg Intravenous Given 8/16/23 1301)     XR chest 2 views   Final Result      Pulmonary edema with trace bilateral pleural effusions. ICD leads are intact. Resident: John Aly, the attending radiologist, have reviewed the images and agree with the final report above.       Workstation performed: [id number]           Orders Placed This Encounter   Procedures   • POC Cardiac US   • XR chest 2 views   • CBC and differential   • Comprehensive metabolic panel   • HS Troponin 0hr (reflex protocol)   • Magnesium   • HS Troponin I 2hr   • HS Troponin I 4hr   • Insert peripheral IV   • Please arrange for icd/pacemaker interrogation   • 24 Hour Telemetry Monitoring   • ECG 12 lead   • INPATIENT ADMISSION     Labs Reviewed   CBC AND DIFFERENTIAL - Abnormal       Result Value Ref Range Status    WBC 6.14  4.31 - 10.16 Thousand/uL Final    RBC 4.99  3.88 - 5.62 Million/uL Final    Hemoglobin 13.5  12.0 - 17.0 g/dL Final    Hematocrit 42.0  36.5 - 49.3 % Final    MCV 84  82 - 98 fL Final    MCH 27.1  26.8 - 34.3 pg Final    MCHC 32.1  31.4 - 37.4 g/dL Final    RDW 16.2 (*) 11.6 - 15.1 % Final    MPV 11.1  8.9 - 12.7 fL Final    Platelets 407  605 - 390 Thousands/uL Final    nRBC 0  /100 WBCs Final    Neutrophils Relative 71  43 - 75 % Final    Immat GRANS % 0  0 - 2 % Final    Lymphocytes Relative 19  14 - 44 % Final    Monocytes Relative 8  4 - 12 % Final    Eosinophils Relative 1  0 - 6 % Final    Basophils Relative 1  0 - 1 % Final    Neutrophils Absolute 4.39  1.85 - 7.62 Thousands/µL Final    Immature Grans Absolute 0.02  0.00 - 0.20 Thousand/uL Final    Lymphocytes Absolute 1.17  0.60 - 4.47 Thousands/µL Final    Monocytes Absolute 0.47  0.17 - 1.22 Thousand/µL Final    Eosinophils Absolute 0.05  0.00 - 0.61 Thousand/µL Final    Basophils Absolute 0.04  0.00 - 0.10 Thousands/µL Final   COMPREHENSIVE METABOLIC PANEL - Abnormal    Sodium 138  135 - 147 mmol/L Final    Potassium 4.8  3.5 - 5.3 mmol/L Final    Comment: Moderately Hemolyzed; Results May be Affected    Chloride 109 (*) 96 - 108 mmol/L Final    CO2 25  21 - 32 mmol/L Final    ANION GAP 4  mmol/L Final    BUN 25  5 - 25 mg/dL Final    Creatinine 1.11  0.60 - 1.30 mg/dL Final    Comment: Standardized to IDMS reference method    Glucose 95  65 - 140 mg/dL Final    Comment: If the patient is fasting, the ADA then defines impaired fasting glucose as > 100 mg/dL and diabetes as > or equal to 123 mg/dL. Specimen collection should occur prior to Sulfasalazine administration due to the potential for falsely depressed results. Specimen collection should occur prior to Sulfapyridine administration due to the potential for falsely elevated results. Calcium 9.2  8.3 - 10.1 mg/dL Final    AST 57 (*) 5 - 45 U/L Final    Comment: Moderately Hemolyzed; Results May be Affected  Specimen collection should occur prior to Sulfasalazine administration due to the potential for falsely depressed results.      ALT 47  12 - 78 U/L Final    Comment: Specimen collection should occur prior to Sulfasalazine and/or Sulfapyridine administration due to the potential for falsely depressed results. Alkaline Phosphatase 89  46 - 116 U/L Final    Total Protein 7.1  6.4 - 8.4 g/dL Final    Albumin 3.5  3.5 - 5.0 g/dL Final    Total Bilirubin 0.98  0.20 - 1.00 mg/dL Final    Comment: Use of this assay is not recommended for patients undergoing treatment with eltrombopag due to the potential for falsely elevated results. eGFR 65  ml/min/1.73sq m Final    Narrative:     [facility] guidelines for Chronic Kidney Disease (CKD):   •  Stage 1 with normal or high GFR (GFR > 90 mL/min/1.73 square meters)  •  Stage 2 Mild CKD (GFR = 60-89 mL/min/1.73 square meters)  •  Stage 3A Moderate CKD (GFR = 45-59 mL/min/1.73 square meters)  •  Stage 3B Moderate CKD (GFR = 30-44 mL/min/1.73 square meters)  •  Stage 4 Severe CKD (GFR = 15-29 mL/min/1.73 square meters)  •  Stage 5 End Stage CKD (GFR <15 mL/min/1.73 square meters)  Note: GFR calculation is accurate only with a steady state creatinine   HS TROPONIN I 0HR - Abnormal    hs TnI 0hr 312 (*) "Refer to ACS Flowchart"- see link ng/L Final    Comment:                                              Initial (time 0) result  If >=50 ng/L, Myocardial injury suggested ;  Type of myocardial injury and treatment strategy  to be determined. If 5-49 ng/L, a delta result at 2 hours and or 4 hours will be needed to further evaluate. If <4 ng/L, and chest pain has been >3 hours since onset, patient may qualify for discharge based on the HEART score in the ED. If <5 ng/L and <3hours since onset of chest pain, a delta result at 2 hours will be needed to further evaluate. HS Troponin 99th Percentile URL of a Health Population=12 ng/L with a 95% Confidence Interval of 8-18 ng/L. Second Troponin (time 2 hours)  If calculated delta >= 20 ng/L,  Myocardial injury suggested ; Type of myocardial injury and treatment strategy to be determined.   If 5-49 ng/L and the calculated delta is 5-19 ng/L, consult medical service for evaluation. Continue evaluation for ischemia on ecg and other possible etiology and repeat hs troponin at 4 hours. If delta is <5 ng/L at 2 hours, consider discharge based on risk stratification via the HEART score (if in ED), or RANDOLPH risk score in IP/Observation. HS Troponin 99th Percentile URL of a Health Population=12 ng/L with a 95% Confidence Interval of 8-18 ng/L.   HS TROPONIN I 2HR - Abnormal    hs TnI 2hr 297 (*) "Refer to ACS Flowchart"- see link ng/L Final    Comment:                                              Initial (time 0) result  If >=50 ng/L, Myocardial injury suggested ;  Type of myocardial injury and treatment strategy  to be determined. If 5-49 ng/L, a delta result at 2 hours and or 4 hours will be needed to further evaluate. If <4 ng/L, and chest pain has been >3 hours since onset, patient may qualify for discharge based on the HEART score in the ED. If <5 ng/L and <3hours since onset of chest pain, a delta result at 2 hours will be needed to further evaluate. HS Troponin 99th Percentile URL of a Health Population=12 ng/L with a 95% Confidence Interval of 8-18 ng/L. Second Troponin (time 2 hours)  If calculated delta >= 20 ng/L,  Myocardial injury suggested ; Type of myocardial injury and treatment strategy to be determined. If 5-49 ng/L and the calculated delta is 5-19 ng/L, consult medical service for evaluation. Continue evaluation for ischemia on ecg and other possible etiology and repeat hs troponin at 4 hours. If delta is <5 ng/L at 2 hours, consider discharge based on risk stratification via the HEART score (if in ED), or RANDOLPH risk score in IP/Observation. HS Troponin 99th Percentile URL of a Health Population=12 ng/L with a 95% Confidence Interval of 8-18 ng/L. Delta 2hr hsTnI -15  <20 ng/L Final   MAGNESIUM - Normal    Magnesium 2.5  1.6 - 2.6 mg/dL Final    Comment: Moderately Hemolyzed;  Results May be Affected   HS TROPONIN I 4HR     Time reflects when diagnosis was documented in both MDM as applicable and the Disposition within this note     Time User Action Codes Description Comment    8/16/2023  1:26 PM Shirley Hughes Add [R09.02] Hypoxia     8/16/2023  1:26 PM Florentin Gomez Gionini [I50.9] CHF (congestive heart failure) [facility])       ED Disposition     ED Disposition   Admit    Condition   Stable    Date/Time   Wed Aug 16, 2023  1:26 PM    Comment   Case was discussed with TREVON and the patient's admission status was agreed to be Admission Status: inpatient status to the service of Dr. Teodoro Chavez . Follow-up Information    None       Patient's Medications    No medications on file     No discharge procedures on file. None                        Portions of the record may have been created with voice recognition software. Occasional wrong word or "sound a like" substitutions may have occurred due to the inherent limitations of voice recognition software. Read the chart carefully and recognize, using context, where substitutions have occurred.     Electronically signed by:  Amber Nicholson

## 2023-08-16 NOTE — ASSESSMENT & PLAN NOTE
Wt Readings from Last 3 Encounters:   No data found for Wt     · Presented to the ED today after experiencing shortness of breath last night with orthopnea. · Here in the ED he was found to have slightly elevated troponin, pulmonary congestion on CXR and lower extremity edema. · Suspect acute on chronic HF but his history is unknown at this time.   · [facility] queried on care everywhere, awaiting data  · Check echo  · Trend troponin  · PT is diuresing well after administration of lasix 40mg in the ED, will continue lasix 40mg BID  · Monitor on telemetry  · Daily weights and BMP  · Intake and output  · Low Na diet  · Consult cardiology for assistance with management

## 2023-08-16 NOTE — ED NOTES
Spoke with Esme Harmon from [place]. He stated he will be in to interrogate the device in approximately 30 minutes.      Amirah Woods  08/16/23 9080

## 2023-08-16 NOTE — CONSULTS
Consultation - Cardiology Team One  Colleen Hunt 67 y.o. male MRN: [id number]  Unit/Bed#: ED 15 Encounter: [id number]    Inpatient consult to Cardiology  Consult performed by: LINNETTE Acharya  Consult ordered by: Stan Tolentino MD          Physician Requesting Consult: Stan Tolentino MD  Reason for Consult / Principal Problem: CHF      Assessment/ Plan:    1. Acute CHF: Pt presented with worsening sob over at least the past few days; + orthopnea, LE edema  Denies any high Na foods. No chest pain  Cxray consistent with CHF. + volume overload on exam with pitting LE edema and rales  Check BNP  He was given a dose of IV lasix 40mg x1 with good response; he has 2L of urine in room; feels his breathing is already a bit better. Agree with lasix 40mg IV BID  Check echo  Low Na diet, daily wts and strict I/Os   Further plan pending results of echo and response to diuretics. 2. HTN: BP elevated on presentation; is prescribed lisinopril 10mg daily, metoprolol succinate 25mg daily; unclear if he has been taking them; start BB; metoprolol succinate 25mg daily and monitor. 3. Reported hx of CAD/MI:  pt does not know any details and unclear it true hx of MI; His pharmacy reports Brilinta 90mg BID as an active medication; first prescribed 1/2023 but last filled 4/2023 with 30 pills. Pt unsure is he has been taking. Will resume and await records. Resuming Brilinta and ASA here  4. S/p AICD: abbott device; he tells me it was placed 12/2022 at [facility] in [place]; unclear reason. Device check ordered in ED. On amiodarone and mexiletine as OP; restarted here by primary team    History of Present Illness      HPI: Colleen Hunt is a 67y.o. year old male who has a history of CAD/MI, Chronic HFrEF, s/p AICD (abbott), HTN. He follows with cardiology at [facility]. Records have been requested. Pt presetned to ED today with SOB that began last night.  He reports a hx of CHF and reduced EF with AICD in place. On presentation to ED he was hypertensive with /109, afebrile, SPO2 93% on RA. EKG showed sinus rhythm with non-specific t-wave abnormalities. Cxray showed trace pleural effusions and pulmonary edema. Initial HS trop 312, next trending down to 297  Cr 1.11, K 3.8, Mag 2.5  CBC unremarkable    He was felt to be volume overloaded/ acute HF and started on IV lasix; given dose of 40mg IV x1 with good response. At time of my exam pt reports feeling better. He lives in [address] [facility] and is visiting this area for a few days. He is very poor historian regarding his medical care and hx and meds. He tells me he takes 2-3 pills a day; sometimes more/sometimes less. He typically uses [facility] in [address] [facility]. He does not know if he sees cardiology regularly. I did contact pharmacy and they confirmed meds; most are already ordered by primary team. Resuming metoprolol succinate 25mg daily, asa 81mg daily, brilinta 90mg BID, spironolactone 25mg daily, lisinopril 10mg daily, HCTZ, amiodarone 400mg daily and mexiletine, farxiga. Based on medications I would assume he had CAD, and likely arrythmia/VT given his AAD and ICD. EKG reviewed personally:  8/16/2023  Sinus rhythm  Non-specific twave abnormalities  No prior ekg to compare    Telemetry reviewed personally:   Sinus rhythm, no events    Review of Systems   Constitutional: Negative for decreased appetite and fever. Cardiovascular: Positive for dyspnea on exertion, leg swelling and orthopnea. Negative for chest pain and palpitations. Respiratory: Negative for cough and shortness of breath. Gastrointestinal: Negative for nausea and vomiting. Genitourinary: Negative for dysuria. Neurological: Negative for dizziness and light-headedness. Psychiatric/Behavioral: Negative for altered mental status. All other systems reviewed and are negative.      Historical Information   Past Medical History:   Diagnosis Date   • MI (myocardial infarction) ([address]) 2022     History reviewed. No pertinent surgical history. Social History     Substance and Sexual Activity   Alcohol Use Never     Social History     Substance and Sexual Activity   Drug Use Never     Social History     Tobacco Use   Smoking Status Never   Smokeless Tobacco Never     Family History: History reviewed. No pertinent family history. Meds/Allergies   current meds:   Current Facility-Administered Medications   Medication Dose Route Frequency   • acetaminophen (TYLENOL) tablet 650 mg  650 mg Oral Q4H PRN   • calcium carbonate (TUMS) chewable tablet 1,000 mg  1,000 mg Oral Daily PRN   • enoxaparin (LOVENOX) subcutaneous injection 40 mg  40 mg Subcutaneous Daily   • furosemide (LASIX) injection 40 mg  40 mg Intravenous BID   • ondansetron (ZOFRAN) injection 4 mg  4 mg Intravenous Q6H PRN   • potassium chloride (K-DUR,KLOR-CON) CR tablet 20 mEq  20 mEq Oral Daily     No Known Allergies    Objective   Vitals: Blood pressure 145/90, pulse 62, temperature (!) 97.2 °F (36.2 °C), temperature source Temporal, resp. rate 22, SpO2 95 %. ,     There is no height or weight on file to calculate BMI.,     Systolic (27OUH), ERL:203 , Min:145 , CXC:687     Diastolic (07BQR), QCJ:40, Min:83, Max:109      Intake/Output Summary (Last 24 hours) at 8/16/2023 1447  Last data filed at 8/16/2023 1347  Gross per 24 hour   Intake --   Output 400 ml   Net -400 ml     Weight (last 2 days)     None        Invasive Devices     Peripheral Intravenous Line  Duration           Peripheral IV 08/16/23 Left;Ventral (anterior) Forearm <1 day              Physical Exam  Vitals reviewed. Constitutional:       General: He is not in acute distress. Appearance: Normal appearance. HENT:      Head: Normocephalic. Mouth/Throat:      Mouth: Mucous membranes are moist.   Cardiovascular:      Rate and Rhythm: Normal rate and regular rhythm. Heart sounds: S1 normal and S2 normal. Murmur heard. Systolic murmur is present with a grade of 2/6. Pulmonary:      Effort: Pulmonary effort is normal.      Breath sounds: Rales present. Comments: On O2 via NC  Abdominal:      Palpations: Abdomen is soft. Musculoskeletal:      Right lower le+ Pitting Edema present. Left lower le+ Pitting Edema present. Skin:     General: Skin is warm. Neurological:      Mental Status: He is alert and oriented to person, place, and time. Psychiatric:         Mood and Affect: Mood normal.       LABORATORY RESULTS:      CBC with diff:   Results from last 7 days   Lab Units 23  1134   WBC Thousand/uL 6.14   HEMOGLOBIN g/dL 13.5   HEMATOCRIT % 42.0   MCV fL 84   PLATELETS Thousands/uL 241   RBC Million/uL 4.99   MCH pg 27.1   MCHC g/dL 32.1   RDW % 16.2*   MPV fL 11.1   NRBC AUTO /100 WBCs 0     CMP:  Results from last 7 days   Lab Units 23  1134   POTASSIUM mmol/L 4.8   CHLORIDE mmol/L 109*   CO2 mmol/L 25   BUN mg/dL 25   CREATININE mg/dL 1.11   CALCIUM mg/dL 9.2   AST U/L 57*   ALT U/L 47   ALK PHOS U/L 89   EGFR ml/min/1.73sq m 65     BMP:  Results from last 7 days   Lab Units 23  1134   POTASSIUM mmol/L 4.8   CHLORIDE mmol/L 109*   CO2 mmol/L 25   BUN mg/dL 25   CREATININE mg/dL 1.11   CALCIUM mg/dL 9.2     Results from last 7 days   Lab Units 23  1134   MAGNESIUM mg/dL 2.5     Imaging: I have personally reviewed pertinent reports. XR chest 2 views    Result Date: 2023  Narrative: CHEST INDICATION:   Chest pain, evaluating for lead fx. . COMPARISON:  None EXAM PERFORMED/VIEWS:  XR CHEST PA & LATERAL Images: 2. FINDINGS: Dual-lead implanted cardiac defibrillator projects over the left chest wall. Both leads are intact with distal tips terminating in the right atrium and right ventricle. EKG leads project over the chest. Cardiomediastinal silhouette appears enlarged. Prominent interstitial lung markings with pulmonary vascular congestion.  Blunting of the bilateral costophrenic angles. No pneumothorax. . No acute osseous abnormality. Impression: Pulmonary edema with trace bilateral pleural effusions. ICD leads are intact. Resident: Néstor Le, the attending radiologist, have reviewed the images and agree with the final report above. Workstation performed: [id number]     Assessment  Principal Problem:    Acute heart failure (HCC)  Active Problems:    Pleural effusion    History of automatic internal cardiac defibrillator (AICD)    HTN (hypertension)    Elevated troponin I level    Thank you for allowing us to participate in this patient's care. He will follow up in [place] upon discharge. Counseling / Coordination of Care  Total floor / unit time spent today 45 minutes. Greater than 50% of total time was spent with the patient and / or family counseling and / or coordination of care. A description of the counseling / coordination of care: Review of history, current assessment, development of a plan. Code Status: Level 1 - Full Code    ** Please Note: Dragon 360 Dictation voice to text software may have been used in the creation of this document.  **

## 2023-08-17 PROBLEM — E87.6 HYPOKALEMIA: Status: ACTIVE | Noted: 2023-08-17

## 2023-08-17 PROBLEM — I50.41 ACUTE COMBINED SYSTOLIC AND DIASTOLIC HEART FAILURE (HCC): Status: ACTIVE | Noted: 2023-08-16

## 2023-08-17 PROBLEM — J96.01 ACUTE RESPIRATORY FAILURE WITH HYPOXIA (HCC): Status: ACTIVE | Noted: 2023-08-17

## 2023-08-17 LAB
ANION GAP SERPL CALCULATED.3IONS-SCNC: 4 MMOL/L
AORTIC ROOT: 3.2 CM
APICAL FOUR CHAMBER EJECTION FRACTION: 28 %
ASCENDING AORTA: 3.5 CM
AV LVOT MEAN GRADIENT: 1 MMHG
AV LVOT PEAK GRADIENT: 2 MMHG
BUN SERPL-MCNC: 21 MG/DL (ref 5–25)
CALCIUM SERPL-MCNC: 8.7 MG/DL (ref 8.3–10.1)
CHLORIDE SERPL-SCNC: 109 MMOL/L (ref 96–108)
CO2 SERPL-SCNC: 29 MMOL/L (ref 21–32)
CREAT SERPL-MCNC: 0.93 MG/DL (ref 0.6–1.3)
DOP CALC LVOT AREA: 3.14 CM2
DOP CALC LVOT CARDIAC INDEX: 1.12 L/MIN/M2
DOP CALC LVOT CARDIAC OUTPUT: 2.3 L/MIN
DOP CALC LVOT DIAMETER: 2 CM
DOP CALC LVOT PEAK VEL VTI: 11.94 CM
DOP CALC LVOT PEAK VEL: 0.68 M/S
DOP CALC LVOT STROKE INDEX: 18.4 ML/M2
DOP CALC LVOT STROKE VOLUME: 37.49
E WAVE DECELERATION TIME: 159 MS
ERYTHROCYTE [DISTWIDTH] IN BLOOD BY AUTOMATED COUNT: 16.1 % (ref 11.6–15.1)
FRACTIONAL SHORTENING: 27 (ref 28–44)
GFR SERPL CREATININE-BSD FRML MDRD: 81 ML/MIN/1.73SQ M
GLUCOSE SERPL-MCNC: 95 MG/DL (ref 65–140)
HCT VFR BLD AUTO: 41.3 % (ref 36.5–49.3)
HGB BLD-MCNC: 13 G/DL (ref 12–17)
INTERVENTRICULAR SEPTUM IN DIASTOLE (PARASTERNAL SHORT AXIS VIEW): 1.5 CM
INTERVENTRICULAR SEPTUM: 1.5 CM (ref 0.6–1.1)
LAAS-AP2: 39.9 CM2
LAAS-AP4: 30.4 CM2
LEFT ATRIUM SIZE: 5.3 CM
LEFT ATRIUM VOLUME (MOD BIPLANE): 142 ML
LEFT INTERNAL DIMENSION IN SYSTOLE: 4.9 CM (ref 2.1–4)
LEFT VENTRICLE DIASTOLIC VOLUME (MOD BIPLANE): 239 ML
LEFT VENTRICLE SYSTOLIC VOLUME (MOD BIPLANE): 180 ML
LEFT VENTRICULAR INTERNAL DIMENSION IN DIASTOLE: 6.7 CM (ref 3.5–6)
LEFT VENTRICULAR POSTERIOR WALL IN END DIASTOLE: 1.3 CM
LEFT VENTRICULAR STROKE VOLUME: 117 ML
LV EF: 25 %
LVSV (TEICH): 117 ML
MCH RBC QN AUTO: 26.4 PG (ref 26.8–34.3)
MCHC RBC AUTO-ENTMCNC: 31.5 G/DL (ref 31.4–37.4)
MCV RBC AUTO: 84 FL (ref 82–98)
MV E'TISSUE VEL-LAT: 10 CM/S
MV E'TISSUE VEL-SEP: 9 CM/S
MV EROA: 0.2 CM2
MV PEAK A VEL: 0.4 M/S
MV PEAK E VEL: 87 CM/S
MV STENOSIS PRESSURE HALF TIME: 46 MS
MV VALVE AREA P 1/2 METHOD: 4.78
PISA MRMAX VEL: 0.3 M/S
PISA RADIUS: 0.7 CM
PLATELET # BLD AUTO: 160 THOUSANDS/UL (ref 149–390)
PMV BLD AUTO: 9.8 FL (ref 8.9–12.7)
POTASSIUM SERPL-SCNC: 3.4 MMOL/L (ref 3.5–5.3)
RA PRESSURE ESTIMATED: 3 MMHG
RBC # BLD AUTO: 4.93 MILLION/UL (ref 3.88–5.62)
RIGHT ATRIUM AREA SYSTOLE A4C: 21.4 CM2
RIGHT VENTRICLE ID DIMENSION: 4.4 CM
RV PSP: 40 MMHG
SL CV LEFT ATRIUM LENGTH A2C: 7.2 CM
SL CV LV EF: 30
SL CV PED ECHO LEFT VENTRICLE DIASTOLIC VOLUME (MOD BIPLANE) 2D: 232 ML
SL CV PED ECHO LEFT VENTRICLE SYSTOLIC VOLUME (MOD BIPLANE) 2D: 115 ML
SODIUM SERPL-SCNC: 142 MMOL/L (ref 135–147)
TR MAX PG: 37 MMHG
TR PEAK VELOCITY: 3.1 M/S
TRICUSPID ANNULAR PLANE SYSTOLIC EXCURSION: 2.5 CM
TRICUSPID VALVE PEAK REGURGITATION VELOCITY: 3.05 M/S
WBC # BLD AUTO: 5.19 THOUSAND/UL (ref 4.31–10.16)

## 2023-08-17 PROCEDURE — 99232 SBSQ HOSP IP/OBS MODERATE 35: CPT | Performed by: INTERNAL MEDICINE

## 2023-08-17 PROCEDURE — 85027 COMPLETE CBC AUTOMATED: CPT | Performed by: INTERNAL MEDICINE

## 2023-08-17 PROCEDURE — 80048 BASIC METABOLIC PNL TOTAL CA: CPT | Performed by: INTERNAL MEDICINE

## 2023-08-17 PROCEDURE — 93306 TTE W/DOPPLER COMPLETE: CPT | Performed by: INTERNAL MEDICINE

## 2023-08-17 PROCEDURE — 99232 SBSQ HOSP IP/OBS MODERATE 35: CPT | Performed by: PHYSICIAN ASSISTANT

## 2023-08-17 RX ORDER — POTASSIUM CHLORIDE 20 MEQ/1
20 TABLET, EXTENDED RELEASE ORAL ONCE
Status: COMPLETED | OUTPATIENT
Start: 2023-08-17 | End: 2023-08-17

## 2023-08-17 RX ADMIN — ATORVASTATIN CALCIUM 40 MG: 40 TABLET, FILM COATED ORAL at 08:42

## 2023-08-17 RX ADMIN — TICAGRELOR 90 MG: 90 TABLET ORAL at 08:43

## 2023-08-17 RX ADMIN — ENOXAPARIN SODIUM 40 MG: 40 INJECTION SUBCUTANEOUS at 08:42

## 2023-08-17 RX ADMIN — METOPROLOL SUCCINATE 25 MG: 25 TABLET, FILM COATED, EXTENDED RELEASE ORAL at 08:42

## 2023-08-17 RX ADMIN — POTASSIUM CHLORIDE 20 MEQ: 1500 TABLET, EXTENDED RELEASE ORAL at 12:46

## 2023-08-17 RX ADMIN — AMIODARONE HYDROCHLORIDE 200 MG: 200 TABLET ORAL at 17:18

## 2023-08-17 RX ADMIN — AMIODARONE HYDROCHLORIDE 200 MG: 200 TABLET ORAL at 08:42

## 2023-08-17 RX ADMIN — FUROSEMIDE 40 MG: 10 INJECTION, SOLUTION INTRAMUSCULAR; INTRAVENOUS at 17:18

## 2023-08-17 RX ADMIN — MEXILETINE HYDROCHLORIDE 250 MG: 250 CAPSULE ORAL at 21:06

## 2023-08-17 RX ADMIN — POTASSIUM CHLORIDE 20 MEQ: 1500 TABLET, EXTENDED RELEASE ORAL at 08:42

## 2023-08-17 RX ADMIN — SPIRONOLACTONE 25 MG: 25 TABLET ORAL at 08:42

## 2023-08-17 RX ADMIN — LISINOPRIL 10 MG: 10 TABLET ORAL at 08:42

## 2023-08-17 RX ADMIN — ASPIRIN 81 MG CHEWABLE TABLET 81 MG: 81 TABLET CHEWABLE at 08:42

## 2023-08-17 RX ADMIN — MEXILETINE HYDROCHLORIDE 250 MG: 250 CAPSULE ORAL at 08:43

## 2023-08-17 RX ADMIN — FUROSEMIDE 40 MG: 10 INJECTION, SOLUTION INTRAMUSCULAR; INTRAVENOUS at 08:42

## 2023-08-17 RX ADMIN — TICAGRELOR 90 MG: 90 TABLET ORAL at 21:06

## 2023-08-17 RX ADMIN — PANTOPRAZOLE SODIUM 40 MG: 40 TABLET, DELAYED RELEASE ORAL at 05:51

## 2023-08-17 NOTE — ASSESSMENT & PLAN NOTE
· POA, pt requiring 3 L NC, previously on RA at home   · Likely in setting of acute CHF exacerbation with pleural effusions  · Continue IV diuresis as above   · Wean supplemental O2 as able, now on RA saturating well

## 2023-08-17 NOTE — PROGRESS NOTES
General Cardiology   Progress Note -  Team One   Jaxson Grullon 67 y.o. male MRN: [id number]    Unit/Bed#: -01 Encounter: [id number]    Assessment/ Plan    1. Acute on chronic HFrEF   Echocardiogram showed EF 30% with grade II diastolic dysfunction   On furosemide 40 mg IV BID  Continue to diurese   Monitor I/Os -3.0 L in 24 hours   Daily weights 183 lbs to day from 185 lbs yesterday   Creatinine 0.93  Records requested from [facility] again today    2. Hypokalemia   K 3.4  Replete     3. Non sustained VT   Presumed history of VT   On Amiodarone, mexiletine and metoprolol XL 25 mg PO daily   Reviewed telemetry showing PVCs and 1 episode of NSVT   Goal keep K >4.0 and Mg > 2.0     4. Hypertension   BP stable: 148/90    5. CAD   With reported prior MI but unclear details  On Brilinta, aspirin, atorvastatin and metoprolol   As noted above records requested     6. Ischemic cardiomyopathy   S/p Abbot AICD 12/2022  On metoprolol XL, lisinopril 10 mg PO daily and spirolactone for GDMT  Diurese as noted above     7. Severe mitral regurgitation   Reviewed on echocardiogram   Diurese as noted above     Subjective  Patient reports he was SOB from 0230 till 0600 this morning but reports no SOB currently. No complaint of chest pain. He reports occasional palpitations. No fever or chills. Review of Systems   Constitutional: Positive for malaise/fatigue. Negative for chills and fever. HENT: Negative for congestion. Cardiovascular: Positive for paroxysmal nocturnal dyspnea. Negative for chest pain and dyspnea on exertion. Respiratory: Negative for shortness of breath. Musculoskeletal: Negative for falls. Gastrointestinal: Negative for bloating, nausea and vomiting. Neurological: Negative for dizziness and light-headedness. Psychiatric/Behavioral: Negative for altered mental status. All other systems reviewed and are negative.       Objective:   Vitals: Blood pressure 151/95, pulse 58, temperature (!) 97.4 °F (36.3 °C), resp. rate 16, height 5' 9" (1.753 m), weight 83.4 kg (183 lb 12.8 oz), SpO2 97 %. ,       Body mass index is 27.14 kg/m². ,     Systolic (09AIL), ART:144 , Min:132 , XHN:865     Diastolic (85IJH), EXY:93, Min:82, Max:109      Intake/Output Summary (Last 24 hours) at 8/17/2023 1105  Last data filed at 8/17/2023 1001  Gross per 24 hour   Intake 960 ml   Output 4355 ml   Net -3395 ml     Weight (last 2 days)     Date/Time Weight    08/17/23 0554 83.4 (183.8)    08/16/23 19:01:12 84.1 (185.4)    08/16/23 1600 89.8 (198)        Telemetry Review: Normal sinus rhythm with PVCs and NSVT     Physical Exam  Constitutional:       General: He is not in acute distress. HENT:      Head: Normocephalic. Mouth/Throat:      Mouth: Mucous membranes are moist.   Cardiovascular:      Rate and Rhythm: Normal rate and regular rhythm. Pulses: Normal pulses. Pulmonary:      Effort: Pulmonary effort is normal. No respiratory distress. Comments: Decreased in bases  RA   Abdominal:      General: Bowel sounds are normal.      Palpations: Abdomen is soft. Musculoskeletal:         General: Swelling present. Normal range of motion. Cervical back: Neck supple. Comments: + 1 edema bilateral LE    Skin:     General: Skin is dry. Capillary Refill: Capillary refill takes less than 2 seconds. Neurological:      Mental Status: He is alert and oriented to person, place, and time.    Psychiatric:         Mood and Affect: Mood normal.         LABORATORY RESULTS      CBC with diff:   Results from last 7 days   Lab Units 08/17/23  0507 08/16/23  1134   WBC Thousand/uL 5.19 6.14   HEMOGLOBIN g/dL 13.0 13.5   HEMATOCRIT % 41.3 42.0   MCV fL 84 84   PLATELETS Thousands/uL 160 241   RBC Million/uL 4.93 4.99   MCH pg 26.4* 27.1   MCHC g/dL 31.5 32.1   RDW % 16.1* 16.2*   MPV fL 9.8 11.1   NRBC AUTO /100 WBCs  --  0       CMP:  Results from last 7 days   Lab Units 08/17/23  0507 08/16/23  1758 08/16/23  1134   POTASSIUM mmol/L 3.4* 3.3* 4.8   CHLORIDE mmol/L 109* 112* 109*   CO2 mmol/L 29 26 25   BUN mg/dL 21 21 25   CREATININE mg/dL 0.93 0.97 1.11   CALCIUM mg/dL 8.7 8.7 9.2   AST U/L  --   --  57*   ALT U/L  --   --  47   ALK PHOS U/L  --   --  89   EGFR ml/min/1.73sq m 81 77 65       BMP:  Results from last 7 days   Lab Units 08/17/23  0507 08/16/23  1755 08/16/23  1134   POTASSIUM mmol/L 3.4* 3.3* 4.8   CHLORIDE mmol/L 109* 112* 109*   CO2 mmol/L 29 26 25   BUN mg/dL 21 21 25   CREATININE mg/dL 0.93 0.97 1.11   CALCIUM mg/dL 8.7 8.7 9.2       No results found for: "NTBNP"          Results from last 7 days   Lab Units 08/16/23  1134   MAGNESIUM mg/dL 2.5                           Lipid Profile:   No results found for: "CHOL"  No results found for: "HDL"  No results found for: "LDLCALC"  No results found for: "TRIG"    Cardiac testing:   No results found for this or any previous visit. No results found for this or any previous visit. No results found for this or any previous visit. No valid procedures specified. No results found for this or any previous visit.     Meds/Allergies   all current active meds have been reviewed and current meds:   Current Facility-Administered Medications   Medication Dose Route Frequency   • acetaminophen (TYLENOL) tablet 650 mg  650 mg Oral Q4H PRN   • amiodarone tablet 200 mg  200 mg Oral BID   • aspirin chewable tablet 81 mg  81 mg Oral Daily   • atorvastatin (LIPITOR) tablet 40 mg  40 mg Oral Daily   • calcium carbonate (TUMS) chewable tablet 1,000 mg  1,000 mg Oral Daily PRN   • enoxaparin (LOVENOX) subcutaneous injection 40 mg  40 mg Subcutaneous Daily   • furosemide (LASIX) injection 40 mg  40 mg Intravenous BID   • lisinopril (ZESTRIL) tablet 10 mg  10 mg Oral Daily   • metoprolol succinate (TOPROL-XL) 24 hr tablet 25 mg  25 mg Oral Daily   • mexiletine (MEXITIL) capsule 250 mg  250 mg Oral Q12H [address]   • ondansetron (ZOFRAN) injection 4 mg  4 mg Intravenous Q6H PRN   • pantoprazole (PROTONIX) EC tablet 40 mg  40 mg Oral Early Morning   • potassium chloride (K-DUR,KLOR-CON) CR tablet 20 mEq  20 mEq Oral Daily   • spironolactone (ALDACTONE) tablet 25 mg  25 mg Oral Daily   • ticagrelor (BRILINTA) tablet 90 mg  90 mg Oral Q12H [facility]     Medications Prior to Admission   Medication   • amiodarone 200 mg tablet   • aspirin 81 mg chewable tablet   • atorvastatin (LIPITOR) 40 mg tablet   • dapagliflozin (Farxiga) 10 MG tablet   • lisinopril (ZESTRIL) 10 mg tablet   • metoprolol succinate (TOPROL-XL) 25 mg 24 hr tablet   • mexiletine (MEXITIL) 200 MG capsule   • omeprazole (PriLOSEC) 40 MG capsule   • spironolactone (ALDACTONE) 25 mg tablet   • ticagrelor (Brilinta) 90 MG       Counseling / Coordination of Care  Total floor / unit time spent today 20 minutes. Greater than 50% of total time was spent with the patient and / or family counseling and / or coordination of care. ** Please Note: Dragon 360 Dictation voice to text software may have been used in the creation of this document.  **

## 2023-08-17 NOTE — PROGRESS NOTES
[facility]  Progress Note  Name: Karen Bradshaw  MRN: [id number]  Unit/Bed#: -01 I Date of Admission: 8/16/2023   Date of Service: 8/17/2023 I Hospital Day: 1  DOS: 8/17/2023  Assessment/Plan   * Acute combined systolic and diastolic heart failure ([address])  Assessment & Plan  Wt Readings from Last 3 Encounters:   08/17/23 83.4 kg (183 lb 12.8 oz)     · Pt presented to the ED after experiencing shortness of breath with orthopnea. · Found to have slightly elevated troponin, pulmonary congestion on CXR and lower extremity edema.   · , trop peak of 312 that down trended   · [facility] [place] queried on care everywhere, awaiting data as no prior ECHOs/labs noted   · Cardiology following,  · ECHO obtained with EF 30% and G2DD  · Continue IV lasix 40 mg BID  · Daily weights, I&Os, low sodium diet   · Monitor on telemetry    Acute respiratory failure with hypoxia (HCC)  Assessment & Plan  · POA, pt requiring 3 L NC, previously on RA at home   · Likely in setting of acute CHF exacerbation with pleural effusions  · Continue IV diuresis as above   · Wean supplemental O2 as able, now on RA saturating well      Hypokalemia  Assessment & Plan  · K+ 3.4   · Replete with 20 mEq KDUR daily   · Prior mag level WNL  · Trend BMP  · Likely in setting of diuresis     Elevated troponin I level  Assessment & Plan  · Troponins 312/297/300, flat  · Possible Non MI troponin elevation due to acute CHF  · Appreciate ongoing cardiology recommendations   · IV diuresis     HTN (hypertension)  Assessment & Plan  · Elevated in the ED, likely due to volume overload, now improved   · Query sent to [facility] and his PCP for records, currently pending  · Prior provider called his [facility] pharmacy here in [place], apparently they do no have him in their records per the pharmacist  · Continue IV lasix 40 mg BID, lisinopril 10 mg daily, Toprol XL 25 mg daily and spironolactone 25 mg daily for now  · Monitor closely    History of automatic internal cardiac defibrillator (AICD)  Assessment & Plan  · Interrogated in the ED, no events per report  · CXR with ICD leads intact     Pleural effusion  Assessment & Plan  · Noted on CXR - trace bilateral pleural effusions noted   · Likely due to CHF  · Monitor for now with diuresis  · Currently on 3 L NC, wean as able        VTE Pharmacologic Prophylaxis:   Moderate Risk (Score 3-4) - Pharmacological DVT Prophylaxis Ordered: enoxaparin (Lovenox). Patient Centered Rounds: I evaluated the patient without nursing staff present due to speaking to nurse outside patient's room   Discussions with Specialists or Other Care Team Provider: Discussed with RNEDWINA and reviewed previous notes     Education and Discussions with Family / Patient: Patient declined call to . Total Time Spent on Date of Encounter in care of patient: 35 minutes This time was spent on one or more of the following: performing physical exam; counseling and coordination of care; obtaining or reviewing history; documenting in the medical record; reviewing/ordering tests, medications or procedures; communicating with other healthcare professionals and discussing with patient's family/caregivers. Current Length of Stay: 1 day(s)  Current Patient Status: Inpatient   Certification Statement: The patient will continue to require additional inpatient hospital stay due to IV diuresis, acute CHF exacerbation   Discharge Plan: Anticipate discharge in 24-48 hrs to home. Code Status: Level 1 - Full Code    Subjective:   Pt reports that overall he is doing much better today. He reports that his shortness of breath is improved, however overnight was having some. Denies any chest pain, abdominal pain, nausea or vomiting.      Objective:     Vitals:   Temp (24hrs), Av.3 °F (36.3 °C), Min:97.2 °F (36.2 °C), Max:97.4 °F (36.3 °C)    Temp:  [97.2 °F (36.2 °C)-97.4 °F (36.3 °C)] 97.4 °F (36.3 °C)  HR:  [58-71] 58  Resp:  [16-22] 16  BP: (132-169)/() 151/95  SpO2:  [92 %-97 %] 97 %  Body mass index is 27.14 kg/m². Input and Output Summary (last 24 hours): Intake/Output Summary (Last 24 hours) at 8/17/2023 1100  Last data filed at 8/17/2023 1001  Gross per 24 hour   Intake 960 ml   Output 4355 ml   Net -3395 ml       Physical Exam:   Physical Exam  Vitals reviewed. Constitutional:       General: He is not in acute distress. Comments: Pt is in no acute distress lying in his hospital bed resting comfortably   HENT:      Head: Normocephalic and atraumatic. Cardiovascular:      Rate and Rhythm: Normal rate and regular rhythm. Pulmonary:      Effort: No respiratory distress. Breath sounds: No wheezing. Abdominal:      General: Bowel sounds are normal. There is no distension. Tenderness: There is no abdominal tenderness. Musculoskeletal:      Right lower leg: Edema present. Left lower leg: Edema present. Comments: Trace lower extremity edema bilaterally    Skin:     General: Skin is warm and dry. Neurological:      Mental Status: He is alert.    Psychiatric:         Mood and Affect: Mood normal.          Additional Data:     Labs:  Results from last 7 days   Lab Units 08/17/23  0507 08/16/23  1134   WBC Thousand/uL 5.19 6.14   HEMOGLOBIN g/dL 13.0 13.5   HEMATOCRIT % 41.3 42.0   PLATELETS Thousands/uL 160 241   NEUTROS PCT %  --  71   LYMPHS PCT %  --  19   MONOS PCT %  --  8   EOS PCT %  --  1     Results from last 7 days   Lab Units 08/17/23  0507 08/16/23  1755 08/16/23  1134   SODIUM mmol/L 142   < > 138   POTASSIUM mmol/L 3.4*   < > 4.8   CHLORIDE mmol/L 109*   < > 109*   CO2 mmol/L 29   < > 25   BUN mg/dL 21   < > 25   CREATININE mg/dL 0.93   < > 1.11   ANION GAP mmol/L 4   < > 4   CALCIUM mg/dL 8.7   < > 9.2   ALBUMIN g/dL  --   --  3.5   TOTAL BILIRUBIN mg/dL  --   --  0.98   ALK PHOS U/L  --   --  89   ALT U/L  --   --  47   AST U/L  --   --  57* GLUCOSE RANDOM mg/dL 95   < > 95    < > = values in this interval not displayed. Lines/Drains:  Invasive Devices     Peripheral Intravenous Line  Duration           Peripheral IV 08/16/23 Left;Ventral (anterior) Forearm <1 day                  Telemetry:  Telemetry Orders (From admission, onward)             24 Hour Telemetry Monitoring  Continuous x 24 Hours (Telem)        Expiring   Question:  Reason for 24 Hour Telemetry  Answer:  Decompensated CHF- and any one of the following: continuous diuretic infusion or total diuretic dose >200 mg daily, associated electrolyte derangement (I.e. K < 3.0), ionotropic drip (continuous infusion), hx of ventricular arrhythmia, or new EF < 35%                 Telemetry Reviewed: Normal Sinus Rhythm  Indication for Continued Telemetry Use: Acute CHF on >200 mg lasix/day or equivalent dose or with new reduced EF.               Imaging: Reviewed radiology reports from this admission including: chest xray    Recent Cultures (last 7 days):         Last 24 Hours Medication List:   Current Facility-Administered Medications   Medication Dose Route Frequency Provider Last Rate   • acetaminophen  650 mg Oral Q4H PRN Gris Grace MD     • amiodarone  200 mg Oral BID Gris Grace MD     • aspirin  81 mg Oral Daily Gris Grace MD     • atorvastatin  40 mg Oral Daily Gris Grace MD     • calcium carbonate  1,000 mg Oral Daily PRN Gris Grace MD     • enoxaparin  40 mg Subcutaneous Daily Gris Grace MD     • furosemide  40 mg Intravenous BID Gris Grace MD     • lisinopril  10 mg Oral Daily Gris Grace MD     • metoprolol succinate  25 mg Oral Daily Gris Grace MD     • mexiletine  250 mg Oral Q12H Belén Sultana MD     • ondansetron  4 mg Intravenous Q6H PRN Gris Grace MD     • pantoprazole  40 mg Oral Early Morning Gris Grace MD     • potassium chloride  20 mEq Oral Daily Gris Grace MD     • spironolactone  25 mg Oral Daily Gabriela Brown MD     • ticagrelor  90 mg Oral Q12H Nafisa Gilman MD          Today, Patient Was Seen By: Martha Mendenhall PA-C    **Please Note: This note may have been constructed using a voice recognition system. **

## 2023-08-17 NOTE — ASSESSMENT & PLAN NOTE
· Noted on CXR - trace bilateral pleural effusions noted   · Likely due to CHF  · Monitor for now with diuresis  · Currently on 3 L NC, wean as able

## 2023-08-17 NOTE — ASSESSMENT & PLAN NOTE
· Troponins 312/297/300, flat  · Possible Non MI troponin elevation due to acute CHF  · Appreciate ongoing cardiology recommendations   · IV diuresis

## 2023-08-17 NOTE — ASSESSMENT & PLAN NOTE
Wt Readings from Last 3 Encounters:   08/17/23 83.4 kg (183 lb 12.8 oz)     · Pt presented to the ED after experiencing shortness of breath with orthopnea. · Found to have slightly elevated troponin, pulmonary congestion on CXR and lower extremity edema.   · , trop peak of 312 that down trended   · [facility], [place] queried on care everywhere, awaiting data as no prior ECHOs/labs noted   · Cardiology following,  · ECHO obtained with EF 30% and G2DD  · Continue IV lasix 40 mg BID  · Daily weights, I&Os, low sodium diet   · Monitor on telemetry

## 2023-08-17 NOTE — ASSESSMENT & PLAN NOTE
· Elevated in the ED, likely due to volume overload, now improved   · Query sent to [facility], [place] and his PCP for records, currently pending  · Prior provider called his [facility] pharmacy here in [place], apparently they do no have him in their records per the pharmacist  · Continue IV lasix 40 mg BID, lisinopril 10 mg daily, Toprol XL 25 mg daily and spironolactone 25 mg daily for now  · Monitor closely

## 2023-08-17 NOTE — ASSESSMENT & PLAN NOTE
· K+ 3.4   · Replete with 20 mEq KDUR daily   · Prior mag level WNL  · Trend BMP  · Likely in setting of diuresis

## 2023-08-17 NOTE — UTILIZATION REVIEW
Initial Clinical Review    Admission: Date/Time/Statement:   Admission Orders (From admission, onward)     Ordered        08/16/23 1327  INPATIENT ADMISSION  Once                      Orders Placed This Encounter   Procedures   • INPATIENT ADMISSION     Standing Status:   Standing     Number of Occurrences:   1     Order Specific Question:   Level of Care     Answer:   Med Surg [16]     Order Specific Question:   Estimated length of stay     Answer:   More than 2 Midnights     Order Specific Question:   Certification     Answer:   I certify that inpatient services are medically necessary for this patient for a duration of greater than two midnights. See H&P and MD Progress Notes for additional information about the patient's course of treatment. ED Arrival Information     Expected   -    Arrival   8/16/2023 11:04    Acuity   Urgent            Means of arrival   Walk-In    Escorted by   Self    Service   Hospitalist    Admission type   Emergency            Arrival complaint   sob           Chief Complaint   Patient presents with   • Shortness of Breath     Pt states "I woke up 10 times last night because I couldn't breathe." Has pacemaker and believes its from that. Initial Presentation: 67 y.o. male presents to ed from home for evaluation and treatment of shortness of breath. Visiting from [place]. Pmhx: pacemaker,  Clinical assessment significant for bibasilar rales, JVD, L> R ankle edema, legs cool mid shins to toes. Imaging shows pulmonary edema, bilateral pleural effusions. Troponin 312  Initially treated with iv lasix, sq lovenox, K dur and po amiodarone. Admit to inpatient med surg for acute systolic and diastolic heart failure. Plan includes, echo, trend troponin, iv lasix bid, telemetry, daily wt, daily BMP, I/O, low na diet. Date: 8- 17-23   Day 2: inpatient med surg   Cardiology consult completed. Echo 30%. Grade 2 diastolic dysfunction. Continue iv lasix. 2L urine output already. Wt down 2 lbs. Set fluid restriction at 50 ounces. Continue home meds. Continue telemetry, I/O and daily wt. AT discharge he will follow up in [place]. Date: 8-18-23   Day 3: Has surpassed a 2nd midnight with active treatments and services, which include iv lasix bid, intake/output, daily wt. ED Triage Vitals [08/16/23 1110]   (!) 97.2 °F (36.2 °C) 71 20 (!) 158/109 93 %      Temporal Monitor         No Pain          08/17/23 83.4 kg (183 lb 12.8 oz)     Additional Vital Signs: . ... Patient Vitals for the past 24 hrs:   BP Temp Pulse Resp SpO2 Weight   08/17/23 1140 150/94 97.8 °F (36.6 °C) 60 16 91 % --   08/17/23 0716 151/95 (!) 97.4 °F (36.3 °C) 58 16 97 % --   08/17/23 0554 -- -- -- -- -- 83.4 kg (183 lb 12.8 oz)   08/16/23 2204 133/82 -- 60 -- 95 % --   08/16/23 1901 138/86 (!) 97.2 °F (36.2 °C) 60 -- 94 % 84.1 kg (185 lb 6.4 oz)   08/16/23 1800 132/83 -- 66 20 96 % --   08/16/23 1730 142/82 -- 66 20 94 % --   08/16/23 1600 158/90 -- 60 20 95 % 89.8 kg (198 lb)   08/16/23 1500 169/89 -- 60 18 97 % --   08/16/23 1400 145/90 -- 62 22 95 % --           Pertinent Labs/Diagnostic Test Results:     XR chest 2 views   Final (08/16 1400)      Pulmonary edema with trace bilateral pleural effusions. ICD leads are intact.                Results from last 7 days   Lab Units 08/17/23  0507 08/16/23  1134   WBC Thousand/uL 5.19 6.14   HEMOGLOBIN g/dL 13.0 13.5   HEMATOCRIT % 41.3 42.0   PLATELETS Thousands/uL 160 241   NEUTROS ABS Thousands/µL  --  4.39         Results from last 7 days   Lab Units 08/17/23  0507 08/16/23  1755 08/16/23  1134   SODIUM mmol/L 142 142 138   POTASSIUM mmol/L 3.4* 3.3* 4.8   CHLORIDE mmol/L 109* 112* 109*   CO2 mmol/L 29 26 25   ANION GAP mmol/L 4 4 4   BUN mg/dL 21 21 25   CREATININE mg/dL 0.93 0.97 1.11   EGFR ml/min/1.73sq m 81 77 65   CALCIUM mg/dL 8.7 8.7 9.2   MAGNESIUM mg/dL  --   --  2.5     Results from last 7 days   Lab Units 08/16/23  1134   AST U/L 57*   ALT U/L 47   ALK PHOS U/L 89   TOTAL PROTEIN g/dL 7.1   ALBUMIN g/dL 3.5   TOTAL BILIRUBIN mg/dL 0.98         Results from last 7 days   Lab Units 08/17/23  0507 08/16/23  1755 08/16/23  1134   GLUCOSE RANDOM mg/dL 95 129 95       Results from last 7 days   Lab Units 08/16/23  1533 08/16/23  1339 08/16/23  1134   HS TNI 0HR ng/L  --   --  312*   HS TNI 2HR ng/L  --  297*  --    HSTNI D2 ng/L  --  -15  --    HS TNI 4HR ng/L 300*  --   --    HSTNI D4 ng/L -12  --   --        Results from last 7 days   Lab Units 08/16/23  1755   BNP pg/mL 813*       ED Treatment:   Medication Administration from 08/16/2023 1104 to 08/16/2023 1853       Date/Time Order Dose Route Action     08/16/2023 1301 EDT furosemide (LASIX) injection 40 mg 40 mg Intravenous Given     08/16/2023 1533 EDT potassium chloride (K-DUR,KLOR-CON) CR tablet 20 mEq 20 mEq Oral Given     08/16/2023 1745 EDT enoxaparin (LOVENOX) subcutaneous injection 40 mg 40 mg Subcutaneous Given     08/16/2023 1806 EDT amiodarone tablet 200 mg 200 mg Oral Given     08/16/2023 1614 EDT perflutren lipid microsphere (DEFINITY) injection 0.6 mL/min Intravenous Given        Past Medical History:   Diagnosis Date   • MI (myocardial infarction) ([address]) 2022     Present on Admission:  None       Admitting Diagnosis:     CHF (congestive heart failure) (HCC) [I50.9]  SOB (shortness of breath) [R06.02]  Hypoxia [R09.02]    Age/Sex: 67 y.o. male    Scheduled Medications:    amiodarone, 200 mg, Oral, BID  aspirin, 81 mg, Oral, Daily  atorvastatin, 40 mg, Oral, Daily  enoxaparin, 40 mg, Subcutaneous, Daily  furosemide, 40 mg, Intravenous, BID  lisinopril, 10 mg, Oral, Daily  metoprolol succinate, 25 mg, Oral, Daily  mexiletine, 250 mg, Oral, Q12H JADIEL  pantoprazole, 40 mg, Oral, Early Morning  spironolactone, 25 mg, Oral, Daily  ticagrelor, 90 mg, Oral, Q12H [address]      Continuous IV Infusions:     PRN Meds:  acetaminophen, 650 mg, Oral, Q4H PRN  calcium carbonate, 1,000 mg, Oral, Daily PRN  ondansetron, 4 mg, Intravenous, Q6H PRN        IP CONSULT TO NUTRITION SERVICES  IP CONSULT TO CARDIOLOGY    Network Utilization Review Department  ATTENTION: Please call with any questions or concerns to [phone] and carefully listen to the prompts so that you are directed to the right person. All voicemails are confidential.  Nikolay oJlly all requests for admission clinical reviews, approved or denied determinations and any other requests to dedicated fax number below belonging to the campus where the patient is receiving treatment.  List of dedicated fax numbers for the Facilities:  [place] DENIALS (Administrative/Medical Necessity) [phone] [address] [facility] (Maternity/NICU/Pediatrics) [phone]   [address] [place] [address] Drive [phone]   [facility] [address] [facility] [phone]   [address] [place] [address] [address] [place] [address] [id number] [place] [address] [phone]   [address] [facility] [address] [facility] [address] [place], [place] [address]y [address] [phone]

## 2023-08-18 PROBLEM — I47.29 NSVT (NONSUSTAINED VENTRICULAR TACHYCARDIA) (HCC): Status: ACTIVE | Noted: 2023-08-18

## 2023-08-18 LAB
ANION GAP SERPL CALCULATED.3IONS-SCNC: 2 MMOL/L
BUN SERPL-MCNC: 24 MG/DL (ref 5–25)
CALCIUM SERPL-MCNC: 9 MG/DL (ref 8.3–10.1)
CHLORIDE SERPL-SCNC: 107 MMOL/L (ref 96–108)
CO2 SERPL-SCNC: 32 MMOL/L (ref 21–32)
CREAT SERPL-MCNC: 1.08 MG/DL (ref 0.6–1.3)
GFR SERPL CREATININE-BSD FRML MDRD: 67 ML/MIN/1.73SQ M
GLUCOSE SERPL-MCNC: 109 MG/DL (ref 65–140)
POTASSIUM SERPL-SCNC: 3.6 MMOL/L (ref 3.5–5.3)
SODIUM SERPL-SCNC: 141 MMOL/L (ref 135–147)

## 2023-08-18 PROCEDURE — 80048 BASIC METABOLIC PNL TOTAL CA: CPT | Performed by: INTERNAL MEDICINE

## 2023-08-18 PROCEDURE — 99232 SBSQ HOSP IP/OBS MODERATE 35: CPT | Performed by: PHYSICIAN ASSISTANT

## 2023-08-18 PROCEDURE — 99232 SBSQ HOSP IP/OBS MODERATE 35: CPT | Performed by: INTERNAL MEDICINE

## 2023-08-18 RX ORDER — LISINOPRIL 20 MG/1
20 TABLET ORAL DAILY
Status: DISCONTINUED | OUTPATIENT
Start: 2023-08-19 | End: 2023-08-19 | Stop reason: HOSPADM

## 2023-08-18 RX ORDER — POTASSIUM CHLORIDE 20 MEQ/1
20 TABLET, EXTENDED RELEASE ORAL ONCE
Status: COMPLETED | OUTPATIENT
Start: 2023-08-18 | End: 2023-08-18

## 2023-08-18 RX ORDER — LISINOPRIL 10 MG/1
10 TABLET ORAL ONCE
Status: COMPLETED | OUTPATIENT
Start: 2023-08-18 | End: 2023-08-18

## 2023-08-18 RX ADMIN — AMIODARONE HYDROCHLORIDE 200 MG: 200 TABLET ORAL at 17:30

## 2023-08-18 RX ADMIN — AMIODARONE HYDROCHLORIDE 200 MG: 200 TABLET ORAL at 09:04

## 2023-08-18 RX ADMIN — LISINOPRIL 10 MG: 10 TABLET ORAL at 09:04

## 2023-08-18 RX ADMIN — TICAGRELOR 90 MG: 90 TABLET ORAL at 09:05

## 2023-08-18 RX ADMIN — FUROSEMIDE 40 MG: 10 INJECTION, SOLUTION INTRAMUSCULAR; INTRAVENOUS at 17:30

## 2023-08-18 RX ADMIN — METOPROLOL SUCCINATE 25 MG: 25 TABLET, FILM COATED, EXTENDED RELEASE ORAL at 09:04

## 2023-08-18 RX ADMIN — LISINOPRIL 10 MG: 10 TABLET ORAL at 13:20

## 2023-08-18 RX ADMIN — POTASSIUM CHLORIDE 20 MEQ: 1500 TABLET, EXTENDED RELEASE ORAL at 13:20

## 2023-08-18 RX ADMIN — TICAGRELOR 90 MG: 90 TABLET ORAL at 21:16

## 2023-08-18 RX ADMIN — FUROSEMIDE 40 MG: 10 INJECTION, SOLUTION INTRAMUSCULAR; INTRAVENOUS at 09:04

## 2023-08-18 RX ADMIN — ENOXAPARIN SODIUM 40 MG: 40 INJECTION SUBCUTANEOUS at 09:04

## 2023-08-18 RX ADMIN — MEXILETINE HYDROCHLORIDE 250 MG: 250 CAPSULE ORAL at 21:16

## 2023-08-18 RX ADMIN — ASPIRIN 81 MG CHEWABLE TABLET 81 MG: 81 TABLET CHEWABLE at 09:04

## 2023-08-18 RX ADMIN — ATORVASTATIN CALCIUM 40 MG: 40 TABLET, FILM COATED ORAL at 09:04

## 2023-08-18 RX ADMIN — MEXILETINE HYDROCHLORIDE 250 MG: 250 CAPSULE ORAL at 09:05

## 2023-08-18 RX ADMIN — SPIRONOLACTONE 25 MG: 25 TABLET ORAL at 09:04

## 2023-08-18 RX ADMIN — PANTOPRAZOLE SODIUM 40 MG: 40 TABLET, DELAYED RELEASE ORAL at 05:11

## 2023-08-18 NOTE — ASSESSMENT & PLAN NOTE
· Elevated in the ED, likely due to volume overload, now improving   · Cardiology increased lisinopril to 20 mg daily   · Continue IV lasix 40 mg BID, Toprol XL 25 mg daily and spironolactone 25 mg daily for now  · Monitor closely

## 2023-08-18 NOTE — UTILIZATION REVIEW
NOTIFICATION OF INPATIENT ADMISSION   AUTHORIZATION REQUEST   SERVICING FACILITY:   [address] [place] [address]  Address: [address] [address]  Tax ID: [id number]  NPI: [id number] ATTENDING PROVIDER:  Attending Name and NPI#: Joel Jadiel [[id number]]  Address: [address] [place] [address] [address] [address]  Phone: [phone]   ADMISSION INFORMATION:  Place of Service: Inpatient [address]  Place of Service Code: 21  Inpatient Admission Date/Time: 8/16/23  1:27 PM  Discharge Date/Time: No discharge date for patient encounter. Admitting Diagnosis Code/Description:  CHF (congestive heart failure) ([address]) [I50.9]  SOB (shortness of breath) [R06.02]  Hypoxia [R09.02]     UTILIZATION REVIEW CONTACT:  Florentina Mohs, Utilization   Network Utilization Review Department  Phone: [phone]  Fax: [phone]  Email: [email or web address]  Contact for approvals/pending authorizations, clinical reviews, and discharge. PHYSICIAN ADVISORY SERVICES:  Medical Necessity Denial & [id number] Review  Phone: [phone]  Fax: [phone]  Email: [email or web address]

## 2023-08-18 NOTE — ASSESSMENT & PLAN NOTE
· Presumed history per review of cardiology notes  · Continue amiodarone 200 mg BID, mexiletine 250 mg BID (home dose of 200 mg TID, however do not have 200 mg tablets here, cardiology aware) and Toprol XL 25 mg daily

## 2023-08-18 NOTE — PLAN OF CARE
Problem: DISCHARGE PLANNING  Goal: Discharge to home or other facility with appropriate resources  Description: INTERVENTIONS:  - Identify barriers to discharge w/patient and caregiver  - Arrange for needed discharge resources and transportation as appropriate  - Identify discharge learning needs (meds, wound care, etc.)  - Arrange for interpretive services to assist at discharge as needed  - Refer to Case Management Department for coordinating discharge planning if the patient needs post-hospital services based on physician/advanced practitioner order or complex needs related to functional status, cognitive ability, or social support system  8/18/2023 1545 by Farrel Bernheim, RN  Outcome: Progressing  8/18/2023 1545 by Farrel Bernheim, RN  Outcome: Progressing     Problem: Knowledge Deficit  Goal: Patient/family/caregiver demonstrates understanding of disease process, treatment plan, medications, and discharge instructions  Description: Complete learning assessment and assess knowledge base.   Interventions:  - Provide teaching at level of understanding  - Provide teaching via preferred learning methods  Outcome: Progressing     Problem: CARDIOVASCULAR - ADULT  Goal: Maintains optimal cardiac output and hemodynamic stability  Description: INTERVENTIONS:  - Monitor I/O, vital signs and rhythm  - Monitor for S/S and trends of decreased cardiac output  - Administer and titrate ordered vasoactive medications to optimize hemodynamic stability  - Assess quality of pulses, skin color and temperature  - Assess for signs of decreased coronary artery perfusion  - Instruct patient to report change in severity of symptoms  Outcome: Progressing  Goal: Absence of cardiac dysrhythmias or at baseline rhythm  Description: INTERVENTIONS:  - Continuous cardiac monitoring, vital signs, obtain 12 lead EKG if ordered  - Administer antiarrhythmic and heart rate control medications as ordered  - Monitor electrolytes and administer replacement therapy as ordered  Outcome: Progressing

## 2023-08-18 NOTE — ASSESSMENT & PLAN NOTE
Wt Readings from Last 3 Encounters:   08/18/23 81.6 kg (179 lb 12.8 oz)     · Pt presented to the ED after experiencing shortness of breath with orthopnea. · Found to have slightly elevated troponin, pulmonary congestion on CXR and lower extremity edema.   · , trop peak of 312 that down trended   · [facility], [place] queried on care everywhere, awaiting data as no prior ECHOs/labs noted, updated home medication list was faxed by PCP and is updated in chart  · Cardiology following,  · ECHO obtained with EF 30% and G2DD  · Continue IV lasix 40 mg BID  · Daily weights, I&Os, low sodium diet   · Monitor on telemetry

## 2023-08-18 NOTE — CASE MANAGEMENT
Case Management Progress Note    Patient name Amy Martinez  Location /-21 MRN [id number]  : 1950 Date 2023       LOS (days): 2  Geometric Mean LOS (GMLOS) (days): 3.90  Days to GMLOS:1.7        OBJECTIVE:        Current admission status: Inpatient  Preferred Pharmacy:   [facility] [address] [place] [address]  Phone: [phone] Fax: [phone]    Primary Care Provider: No primary care provider on file. Primary Insurance: [address] [place] [address]  Secondary Insurance:     PROGRESS NOTE:    Per SLIM provider update, Janice TOLLIVER, pt requires continued inpatient stay due to fluid volume overload and requires IV diuresis. Possible dc in 24-48 hours when pt can be safely transitioned off IV diuresis to oral. CM will follow for any dc planning needs.

## 2023-08-18 NOTE — DISCHARGE INSTR - AVS FIRST PAGE
Take your medications as directed, and keep your follow up appointments. Adhere to a heart healthy lifestyle, maintaining a low sodium diet. Daily weight and record.   If your weight increases 2-3 lbs in one day, or 5 lbs in 2 days, you are short of breath or have lower extremity swelling, please call Primary cardiologist.

## 2023-08-18 NOTE — PROGRESS NOTES
General Cardiology   Progress Note -  Team One   Larena Opitz 67 y.o. male MRN: [id number]    Unit/Bed#: -01 Encounter: [id number]    Assessment/ Plan    1. Acute on chronic HFrEF   Echocardiogram 8/16 showed EF 30% with grade II diastolic dysfunction   On furosemide 40 mg IV BID  Continue to diurese with IV diuretics. Likely transition to oral tomorrow  Will need maintenance diuretic at home   Monitor I/Os - 2.3 L in 24 hours   Daily weights 179 lbs to day from 183 lbs yesterday   Creatinine 1.0  Records requested from [facility], [facility] yesterday and 8/16     2. Hypokalemia   K 3.6 today   Replete      3. Non sustained VT   Presumed history of VT   On Amiodarone, mexiletine and metoprolol XL 25 mg PO daily   Goal keep K >4.0 and Mg > 2.0      4. Hypertension   BP stable: 144/88     5. CAD   With reported prior MI but unclear details  On Brilinta, aspirin, atorvastatin and metoprolol   As noted above records requested   He has follow up scheduled with primary cardiologist within 2 weeks      6. Ischemic cardiomyopathy   S/p [place] AICD 12/2022  On metoprolol XL, lisinopril 10 mg PO daily and spirolactone for GDMT  Diurese as noted above      7. Severe mitral regurgitation   Reviewed on echocardiogram   Diurese as noted above        Subjective  Patient is walking around the room. He reports he had a good night. No further PND. No complaint of SOB, chest pain or palpitations. He continues with mild LLE edema. Review of Systems   Constitutional: Negative for chills and fever. HENT: Negative for congestion. Cardiovascular: Positive for leg swelling. Negative for chest pain, dyspnea on exertion and orthopnea. Respiratory: Negative for shortness of breath. Musculoskeletal: Negative for falls. Gastrointestinal: Negative for bloating, nausea and vomiting. Psychiatric/Behavioral: Negative for altered mental status. All other systems reviewed and are negative.       Objective:   Vitals: Blood pressure 159/99, pulse 64, temperature (!) 97.2 °F (36.2 °C), temperature source Oral, resp. rate 17, height 5' 9" (1.753 m), weight 81.6 kg (179 lb 12.8 oz), SpO2 95 %. ,       Body mass index is 26.55 kg/m². ,     Systolic (05NZS), EC , Min:130 , XNH:881     Diastolic (07QWR), XRJ:38, Min:82, Max:99      Intake/Output Summary (Last 24 hours) at 2023 1053  Last data filed at 2023 0955  Gross per 24 hour   Intake 656 ml   Output 2500 ml   Net -1844 ml     Weight (last 2 days)     Date/Time Weight    23 0600 81.6 (179.8)    23 0554 83.4 (183.8)    23 19:01:12 84.1 (185.4)    23 1600 89.8 (198)          Physical Exam  Constitutional:       General: He is not in acute distress. HENT:      Head: Normocephalic. Mouth/Throat:      Mouth: Mucous membranes are moist.   Cardiovascular:      Rate and Rhythm: Normal rate and regular rhythm. Pulses: Normal pulses. Heart sounds: Murmur heard. Pulmonary:      Effort: Pulmonary effort is normal. No respiratory distress. Breath sounds: Normal breath sounds. Abdominal:      General: Bowel sounds are normal.      Palpations: Abdomen is soft. Musculoskeletal:         General: Swelling present. Normal range of motion. Comments: Bilateral LE edema L>R. LLE + 1 edema    Skin:     General: Skin is warm and dry. Capillary Refill: Capillary refill takes less than 2 seconds. Neurological:      Mental Status: He is alert and oriented to person, place, and time.    Psychiatric:         Mood and Affect: Mood normal.         LABORATORY RESULTS      CBC with diff:   Results from last 7 days   Lab Units 23  0507 23  1134   WBC Thousand/uL 5.19 6.14   HEMOGLOBIN g/dL 13.0 13.5   HEMATOCRIT % 41.3 42.0   MCV fL 84 84   PLATELETS Thousands/uL 160 241   RBC Million/uL 4.93 4.99   MCH pg 26.4* 27.1   MCHC g/dL 31.5 32.1   RDW % 16.1* 16.2*   MPV fL 9.8 11.1   NRBC AUTO /100 WBCs  --  0       CMP:  Results from last 7 days   Lab Units 08/18/23  0448 08/17/23  0507 08/16/23  1755 08/16/23  1134   POTASSIUM mmol/L 3.6 3.4* 3.3* 4.8   CHLORIDE mmol/L 107 109* 112* 109*   CO2 mmol/L 32 29 26 25   BUN mg/dL 24 21 21 25   CREATININE mg/dL 1.08 0.93 0.97 1.11   CALCIUM mg/dL 9.0 8.7 8.7 9.2   AST U/L  --   --   --  57*   ALT U/L  --   --   --  47   ALK PHOS U/L  --   --   --  89   EGFR ml/min/1.73sq m 67 81 77 65       BMP:  Results from last 7 days   Lab Units 08/18/23  0448 08/17/23  0507 08/16/23  1755 08/16/23  1134   POTASSIUM mmol/L 3.6 3.4* 3.3* 4.8   CHLORIDE mmol/L 107 109* 112* 109*   CO2 mmol/L 32 29 26 25   BUN mg/dL 24 21 21 25   CREATININE mg/dL 1.08 0.93 0.97 1.11   CALCIUM mg/dL 9.0 8.7 8.7 9.2       No results found for: "NTBNP"          Results from last 7 days   Lab Units 08/16/23  1134   MAGNESIUM mg/dL 2.5                           Lipid Profile:   No results found for: "CHOL"  No results found for: "HDL"  No results found for: "LDLCALC"  No results found for: "TRIG"    Cardiac testing:   No results found for this or any previous visit. No results found for this or any previous visit. No results found for this or any previous visit. No valid procedures specified. No results found for this or any previous visit.     Meds/Allergies   all current active meds have been reviewed and current meds:   Current Facility-Administered Medications   Medication Dose Route Frequency   • acetaminophen (TYLENOL) tablet 650 mg  650 mg Oral Q4H PRN   • amiodarone tablet 200 mg  200 mg Oral BID   • aspirin chewable tablet 81 mg  81 mg Oral Daily   • atorvastatin (LIPITOR) tablet 40 mg  40 mg Oral Daily   • calcium carbonate (TUMS) chewable tablet 1,000 mg  1,000 mg Oral Daily PRN   • enoxaparin (LOVENOX) subcutaneous injection 40 mg  40 mg Subcutaneous Daily   • furosemide (LASIX) injection 40 mg  40 mg Intravenous BID   • lisinopril (ZESTRIL) tablet 10 mg  10 mg Oral Daily   • metoprolol succinate (TOPROL-XL) 24 hr tablet 25 mg  25 mg Oral Daily   • mexiletine (MEXITIL) capsule 250 mg  250 mg Oral Q12H [facility]   • ondansetron (ZOFRAN) injection 4 mg  4 mg Intravenous Q6H PRN   • pantoprazole (PROTONIX) EC tablet 40 mg  40 mg Oral Early Morning   • spironolactone (ALDACTONE) tablet 25 mg  25 mg Oral Daily   • ticagrelor (BRILINTA) tablet 90 mg  90 mg Oral Q12H JADIEL     Medications Prior to Admission   Medication   • amiodarone 200 mg tablet   • aspirin 81 mg chewable tablet   • atorvastatin (LIPITOR) 40 mg tablet   • dapagliflozin (Farxiga) 10 MG tablet   • lisinopril (ZESTRIL) 10 mg tablet   • metoprolol succinate (TOPROL-XL) 25 mg 24 hr tablet   • mexiletine (MEXITIL) 200 MG capsule   • omeprazole (PriLOSEC) 40 MG capsule   • spironolactone (ALDACTONE) 25 mg tablet   • ticagrelor (Brilinta) 90 MG     Counseling / Coordination of Care  Total floor / unit time spent today 20 minutes. Greater than 50% of total time was spent with the patient and / or family counseling and / or coordination of care. ** Please Note: Dragon 360 Dictation voice to text software may have been used in the creation of this document.  **

## 2023-08-18 NOTE — ASSESSMENT & PLAN NOTE
· Interrogated in the ED, no events per report  · CXR with ICD leads intact   · Secondary to history of ischemic CM  · Continue Toprol XL 25 mg daily, lisinopril increased to 20 mg daily and spironolactone 25 mg daily   · Continue ASA, Brilinta, statin

## 2023-08-18 NOTE — ASSESSMENT & PLAN NOTE
· Noted on CXR - trace bilateral pleural effusions noted   · Likely due to CHF  · Monitor for now with IV diuresis  · Currently on RA saturating well

## 2023-08-18 NOTE — ASSESSMENT & PLAN NOTE
· POA, was requiring up to 3 L NC, now weaned to RA and saturating well   · Likely in setting of acute CHF exacerbation with pleural effusions  · Continue IV diuresis as above

## 2023-08-18 NOTE — PROGRESS NOTES
[address] [facility]  Progress Note  Name: Valentin Shaikh  MRN: [id number]  Unit/Bed#: -01 I Date of Admission: 8/16/2023   Date of Service: 8/18/2023 I Hospital Day: 2  DOS: 8/18/2023  Assessment/Plan   * Acute combined systolic and diastolic heart failure (HCC)  Assessment & Plan  Wt Readings from Last 3 Encounters:   08/18/23 81.6 kg (179 lb 12.8 oz)     · Pt presented to the ED after experiencing shortness of breath with orthopnea. · Found to have slightly elevated troponin, pulmonary congestion on CXR and lower extremity edema.   · , trop peak of 312 that down trended   · [facility] queried on care everywhere, awaiting data as no prior ECHOs/labs noted, updated home medication list was faxed by PCP and is updated in chart  · Cardiology following,  · ECHO obtained with EF 30% and G2DD  · Continue IV lasix 40 mg BID  · Daily weights, I&Os, low sodium diet   · Monitor on telemetry    NSVT (nonsustained ventricular tachycardia) (HCC)  Assessment & Plan  · Presumed history per review of cardiology notes  · Continue amiodarone 200 mg BID, mexiletine 250 mg BID (home dose of 200 mg TID, however do not have 200 mg tablets here, cardiology aware) and Toprol XL 25 mg daily     Acute respiratory failure with hypoxia ([address])  Assessment & Plan  · POA, was requiring up to 3 L NC, now weaned to RA and saturating well   · Likely in setting of acute CHF exacerbation with pleural effusions  · Continue IV diuresis as above     Hypokalemia  Assessment & Plan  · Repleted with resolution   · Prior mag level WNL  · Trend BMP  · Likely in setting of diuresis     Elevated troponin I level  Assessment & Plan  · Troponins 312/297/300, flat  · Possible Non MI troponin elevation due to acute CHF  · Appreciate ongoing cardiology recommendations   · IV diuresis     HTN (hypertension)  Assessment & Plan  · Elevated in the ED, likely due to volume overload, now improving   · Cardiology increased lisinopril to 20 mg daily   · Continue IV lasix 40 mg BID, Toprol XL 25 mg daily and spironolactone 25 mg daily for now  · Monitor closely    History of automatic internal cardiac defibrillator (AICD)  Assessment & Plan  · Interrogated in the ED, no events per report  · CXR with ICD leads intact   · Secondary to history of ischemic CM  · Continue Toprol XL 25 mg daily, lisinopril increased to 20 mg daily and spironolactone 25 mg daily   · Continue ASA, Brilinta, statin     Pleural effusion  Assessment & Plan  · Noted on CXR - trace bilateral pleural effusions noted   · Likely due to CHF  · Monitor for now with IV diuresis  · Currently on RA saturating well          VTE Pharmacologic Prophylaxis:   Moderate Risk (Score 3-4) - Pharmacological DVT Prophylaxis Ordered: enoxaparin (Lovenox). Patient Centered Rounds: I evaluated the patient without nursing staff present due to speaking to nurse outside patient's room   Discussions with Specialists or Other Care Team Provider: Discussed with cardiology, RN, CM and reviewed previous notes     Education and Discussions with Family / Patient: Patient declined call to . Total Time Spent on Date of Encounter in care of patient: 35 minutes This time was spent on one or more of the following: performing physical exam; counseling and coordination of care; obtaining or reviewing history; documenting in the medical record; reviewing/ordering tests, medications or procedures; communicating with other healthcare professionals and discussing with patient's family/caregivers. Current Length of Stay: 2 day(s)  Current Patient Status: Inpatient   Certification Statement: The patient will continue to require additional inpatient hospital stay due to IV diuresis, ongoing cardiology recommendations  Discharge Plan: Anticipate discharge in 24-48 hrs to home.     Code Status: Level 1 - Full Code    Subjective:   Pt reports that overall he is feeling much better today. Denies any chest pain, shortness of breath, abdominal pain, nausea or vomiting. States that he is going to follow up with his cardiologist outpatient once he is stable to leave here. Objective:     Vitals:   Temp (24hrs), Av.6 °F (36.4 °C), Min:97.2 °F (36.2 °C), Max:97.9 °F (36.6 °C)    Temp:  [97.2 °F (36.2 °C)-97.9 °F (36.6 °C)] 97.6 °F (36.4 °C)  HR:  [47-64] 47  Resp:  [16-17] 16  BP: (139-159)/(82-99) 153/92  SpO2:  [91 %-95 %] 93 %  Body mass index is 26.55 kg/m². Input and Output Summary (last 24 hours): Intake/Output Summary (Last 24 hours) at 2023 1601  Last data filed at 2023 1257  Gross per 24 hour   Intake 900 ml   Output 1700 ml   Net -800 ml       Physical Exam:   Physical Exam  Vitals reviewed. Constitutional:       Comments: Pt is in no acute distress lying in his hospital bed resting comfortably. On RA saturating mid 90s   HENT:      Head: Normocephalic and atraumatic. Cardiovascular:      Rate and Rhythm: Normal rate and regular rhythm. Pulmonary:      Effort: No respiratory distress. Breath sounds: Normal breath sounds. No wheezing. Abdominal:      General: There is no distension. Palpations: Abdomen is soft. Tenderness: There is no abdominal tenderness. Musculoskeletal:      Right lower leg: Edema present. Left lower leg: Edema present. Comments: Trace lower extremity edema bilaterally      Skin:     General: Skin is warm and dry. Neurological:      Mental Status: He is alert.           Additional Data:     Labs:  Results from last 7 days   Lab Units 23  0507 23  1134   WBC Thousand/uL 5.19 6.14   HEMOGLOBIN g/dL 13.0 13.5   HEMATOCRIT % 41.3 42.0   PLATELETS Thousands/uL 160 241   NEUTROS PCT %  --  71   LYMPHS PCT %  --  19   MONOS PCT %  --  8   EOS PCT %  --  1     Results from last 7 days   Lab Units 23  0448 23  1755 23  1134   SODIUM mmol/L 141   < > 138   POTASSIUM mmol/L 3.6   < > 4.8 CHLORIDE mmol/L 107   < > 109*   CO2 mmol/L 32   < > 25   BUN mg/dL 24   < > 25   CREATININE mg/dL 1.08   < > 1.11   ANION GAP mmol/L 2   < > 4   CALCIUM mg/dL 9.0   < > 9.2   ALBUMIN g/dL  --   --  3.5   TOTAL BILIRUBIN mg/dL  --   --  0.98   ALK PHOS U/L  --   --  89   ALT U/L  --   --  47   AST U/L  --   --  57*   GLUCOSE RANDOM mg/dL 109   < > 95    < > = values in this interval not displayed. Lines/Drains:  Invasive Devices     Peripheral Intravenous Line  Duration           Peripheral IV 23 Left;Ventral (anterior) Forearm 2 days                  Telemetry:  Telemetry Orders (From admission, onward)             24 Hour Telemetry Monitoring  Continuous x 24 Hours (Telem)           Question:  Reason for 24 Hour Telemetry  Answer:  Decompensated CHF- and any one of the following: continuous diuretic infusion or total diuretic dose >200 mg daily, associated electrolyte derangement (I.e. K < 3.0), ionotropic drip (continuous infusion), hx of ventricular arrhythmia, or new EF < 35%                 Telemetry Reviewed: Normal Sinus Rhythm  Indication for Continued Telemetry Use: Acute CHF on >200 mg lasix/day or equivalent dose or with new reduced EF. Imaging: No pertinent imaging reviewed.     Recent Cultures (last 7 days):         Last 24 Hours Medication List:   Current Facility-Administered Medications   Medication Dose Route Frequency Provider Last Rate   • acetaminophen  650 mg Oral Q4H PRN Marjorie Shultz MD     • amiodarone  200 mg Oral BID Marjorie Shultz MD     • aspirin  81 mg Oral Daily Marjorie Shultz MD     • atorvastatin  40 mg Oral Daily Marjorie Shultz MD     • calcium carbonate  1,000 mg Oral Daily PRN Marjorie Shultz MD     • enoxaparin  40 mg Subcutaneous Daily Marjorie Shultz MD     • furosemide  40 mg Intravenous BID Marjorie Shultz MD     • [START ON 2023] lisinopril  20 mg Oral Daily Anette Quintero MD     • metoprolol succinate  25 mg Oral Daily Sarahi Strong Sharmaine Barr MD     • mexiletine  250 mg Oral Q12H Pricilla Ramirez MD     • ondansetron  4 mg Intravenous Q6H PRN Darío Nicole MD     • pantoprazole  40 mg Oral Early Morning Darío Nicole MD     • spironolactone  25 mg Oral Daily Darío Nicole MD     • ticagrelor  90 mg Oral Q12H Pricilla Ramirez MD          Today, Patient Was Seen By: William Oliveira PA-C    **Please Note: This note may have been constructed using a voice recognition system. **

## 2023-08-19 VITALS
BODY MASS INDEX: 26.36 KG/M2 | HEART RATE: 60 BPM | WEIGHT: 178 LBS | RESPIRATION RATE: 18 BRPM | DIASTOLIC BLOOD PRESSURE: 86 MMHG | SYSTOLIC BLOOD PRESSURE: 144 MMHG | TEMPERATURE: 97.7 F | HEIGHT: 69 IN | OXYGEN SATURATION: 96 %

## 2023-08-19 LAB
ANION GAP SERPL CALCULATED.3IONS-SCNC: 6 MMOL/L
BUN SERPL-MCNC: 24 MG/DL (ref 5–25)
CALCIUM SERPL-MCNC: 8.7 MG/DL (ref 8.3–10.1)
CHLORIDE SERPL-SCNC: 106 MMOL/L (ref 96–108)
CO2 SERPL-SCNC: 30 MMOL/L (ref 21–32)
CREAT SERPL-MCNC: 1.02 MG/DL (ref 0.6–1.3)
GFR SERPL CREATININE-BSD FRML MDRD: 72 ML/MIN/1.73SQ M
GLUCOSE SERPL-MCNC: 99 MG/DL (ref 65–140)
POTASSIUM SERPL-SCNC: 3.3 MMOL/L (ref 3.5–5.3)
SODIUM SERPL-SCNC: 142 MMOL/L (ref 135–147)

## 2023-08-19 PROCEDURE — 99232 SBSQ HOSP IP/OBS MODERATE 35: CPT | Performed by: INTERNAL MEDICINE

## 2023-08-19 PROCEDURE — 99239 HOSP IP/OBS DSCHRG MGMT >30: CPT | Performed by: PHYSICIAN ASSISTANT

## 2023-08-19 PROCEDURE — 80048 BASIC METABOLIC PNL TOTAL CA: CPT | Performed by: PHYSICIAN ASSISTANT

## 2023-08-19 RX ORDER — FUROSEMIDE 40 MG/1
40 TABLET ORAL DAILY
Status: DISCONTINUED | OUTPATIENT
Start: 2023-08-20 | End: 2023-08-19 | Stop reason: HOSPADM

## 2023-08-19 RX ORDER — POTASSIUM CHLORIDE 20 MEQ/1
40 TABLET, EXTENDED RELEASE ORAL 2 TIMES DAILY
Status: DISCONTINUED | OUTPATIENT
Start: 2023-08-19 | End: 2023-08-19 | Stop reason: HOSPADM

## 2023-08-19 RX ORDER — FUROSEMIDE 40 MG/1
40 TABLET ORAL DAILY
Qty: 30 TABLET | Refills: 0 | Status: SHIPPED | OUTPATIENT
Start: 2023-08-20

## 2023-08-19 RX ADMIN — AMIODARONE HYDROCHLORIDE 200 MG: 200 TABLET ORAL at 08:36

## 2023-08-19 RX ADMIN — METOPROLOL SUCCINATE 25 MG: 25 TABLET, FILM COATED, EXTENDED RELEASE ORAL at 08:36

## 2023-08-19 RX ADMIN — ENOXAPARIN SODIUM 40 MG: 40 INJECTION SUBCUTANEOUS at 08:36

## 2023-08-19 RX ADMIN — ASPIRIN 81 MG CHEWABLE TABLET 81 MG: 81 TABLET CHEWABLE at 08:36

## 2023-08-19 RX ADMIN — FUROSEMIDE 40 MG: 10 INJECTION, SOLUTION INTRAMUSCULAR; INTRAVENOUS at 08:36

## 2023-08-19 RX ADMIN — LISINOPRIL 20 MG: 20 TABLET ORAL at 08:36

## 2023-08-19 RX ADMIN — SPIRONOLACTONE 25 MG: 25 TABLET ORAL at 08:36

## 2023-08-19 RX ADMIN — ATORVASTATIN CALCIUM 40 MG: 40 TABLET, FILM COATED ORAL at 08:36

## 2023-08-19 RX ADMIN — PANTOPRAZOLE SODIUM 40 MG: 40 TABLET, DELAYED RELEASE ORAL at 05:12

## 2023-08-19 RX ADMIN — MEXILETINE HYDROCHLORIDE 250 MG: 250 CAPSULE ORAL at 08:37

## 2023-08-19 RX ADMIN — TICAGRELOR 90 MG: 90 TABLET ORAL at 08:37

## 2023-08-19 RX ADMIN — POTASSIUM CHLORIDE 40 MEQ: 1500 TABLET, EXTENDED RELEASE ORAL at 08:36

## 2023-08-19 NOTE — ASSESSMENT & PLAN NOTE
· POA, was requiring up to 3 L NC, now weaned to RA and saturating well   · Likely in setting of acute CHF exacerbation with pleural effusions

## 2023-08-19 NOTE — PLAN OF CARE
Problem: PAIN - ADULT  Goal: Verbalizes/displays adequate comfort level or baseline comfort level  Description: Interventions:  - Encourage patient to monitor pain and request assistance  - Assess pain using appropriate pain scale  - Administer analgesics based on type and severity of pain and evaluate response  - Implement non-pharmacological measures as appropriate and evaluate response  - Consider cultural and social influences on pain and pain management  - Notify physician/advanced practitioner if interventions unsuccessful or patient reports new pain  Outcome: Progressing     Problem: INFECTION - ADULT  Goal: Absence or prevention of progression during hospitalization  Description: INTERVENTIONS:  - Assess and monitor for signs and symptoms of infection  - Monitor lab/diagnostic results  - Monitor all insertion sites, i.e. indwelling lines, tubes, and drains  - Monitor endotracheal if appropriate and nasal secretions for changes in amount and color  - Brilliant appropriate cooling/warming therapies per order  - Administer medications as ordered  - Instruct and encourage patient and family to use good hand hygiene technique  - Identify and instruct in appropriate isolation precautions for identified infection/condition  Outcome: Progressing  Goal: Absence of fever/infection during neutropenic period  Description: INTERVENTIONS:  - Monitor WBC    Outcome: Progressing     Problem: SAFETY ADULT  Goal: Patient will remain free of falls  Description: INTERVENTIONS:  - Educate patient/family on patient safety including physical limitations  - Instruct patient to call for assistance with activity   - Consult OT/PT to assist with strengthening/mobility   - Keep Call bell within reach  - Keep bed low and locked with side rails adjusted as appropriate  - Keep care items and personal belongings within reach  - Initiate and maintain comfort rounds  - Make Fall Risk Sign visible to staff  - Offer Toileting every  Hours, in advance of need  - Initiate/Maintain alarm  - Obtain necessary fall risk management equipment:   - Apply yellow socks and bracelet for high fall risk patients  - Consider moving patient to room near nurses station  Outcome: Progressing  Goal: Maintain or return to baseline ADL function  Description: INTERVENTIONS:  -  Assess patient's ability to carry out ADLs; assess patient's baseline for ADL function and identify physical deficits which impact ability to perform ADLs (bathing, care of mouth/teeth, toileting, grooming, dressing, etc.)  - Assess/evaluate cause of self-care deficits   - Assess range of motion  - Assess patient's mobility; develop plan if impaired  - Assess patient's need for assistive devices and provide as appropriate  - Encourage maximum independence but intervene and supervise when necessary  - Involve family in performance of ADLs  - Assess for home care needs following discharge   - Consider OT consult to assist with ADL evaluation and planning for discharge  - Provide patient education as appropriate  Outcome: Progressing  Goal: Maintains/Returns to pre admission functional level  Description: INTERVENTIONS:  - Perform BMAT or MOVE assessment daily.   - Set and communicate daily mobility goal to care team and patient/family/caregiver. - Collaborate with rehabilitation services on mobility goals if consulted  - Perform Range of Motion  times a day. - Reposition patient every  hours.   - Dangle patient  times a day  - Stand patient  times a day  - Ambulate patient  times a day  - Out of bed to chair  times a day   - Out of bed for meals  times a day  - Out of bed for toileting  - Record patient progress and toleration of activity level   Outcome: Progressing     Problem: DISCHARGE PLANNING  Goal: Discharge to home or other facility with appropriate resources  Description: INTERVENTIONS:  - Identify barriers to discharge w/patient and caregiver  - Arrange for needed discharge resources and transportation as appropriate  - Identify discharge learning needs (meds, wound care, etc.)  - Arrange for interpretive services to assist at discharge as needed  - Refer to Case Management Department for coordinating discharge planning if the patient needs post-hospital services based on physician/advanced practitioner order or complex needs related to functional status, cognitive ability, or social support system  Outcome: Progressing     Problem: Knowledge Deficit  Goal: Patient/family/caregiver demonstrates understanding of disease process, treatment plan, medications, and discharge instructions  Description: Complete learning assessment and assess knowledge base. Interventions:  - Provide teaching at level of understanding  - Provide teaching via preferred learning methods  Outcome: Progressing     Problem: Nutrition/Hydration-ADULT  Goal: Nutrient/Hydration intake appropriate for improving, restoring or maintaining nutritional needs  Description: Monitor and assess patient's nutrition/hydration status for malnutrition. Collaborate with interdisciplinary team and initiate plan and interventions as ordered. Monitor patient's weight and dietary intake as ordered or per policy. Utilize nutrition screening tool and intervene as necessary. Determine patient's food preferences and provide high-protein, high-caloric foods as appropriate.      INTERVENTIONS:  - Monitor oral intake, urinary output, labs, and treatment plans  - Assess nutrition and hydration status and recommend course of action  - Evaluate amount of meals eaten  - Assist patient with eating if necessary   - Allow adequate time for meals  - Recommend/ encourage appropriate diets, oral nutritional supplements, and vitamin/mineral supplements  - Order, calculate, and assess calorie counts as needed  - Recommend, monitor, and adjust tube feedings and TPN/PPN based on assessed needs  - Assess need for intravenous fluids  - Provide specific nutrition/hydration education as appropriate  - Include patient/family/caregiver in decisions related to nutrition  Outcome: Progressing     Problem: CARDIOVASCULAR - ADULT  Goal: Maintains optimal cardiac output and hemodynamic stability  Description: INTERVENTIONS:  - Monitor I/O, vital signs and rhythm  - Monitor for S/S and trends of decreased cardiac output  - Administer and titrate ordered vasoactive medications to optimize hemodynamic stability  - Assess quality of pulses, skin color and temperature  - Assess for signs of decreased coronary artery perfusion  - Instruct patient to report change in severity of symptoms  Outcome: Progressing  Goal: Absence of cardiac dysrhythmias or at baseline rhythm  Description: INTERVENTIONS:  - Continuous cardiac monitoring, vital signs, obtain 12 lead EKG if ordered  - Administer antiarrhythmic and heart rate control medications as ordered  - Monitor electrolytes and administer replacement therapy as ordered  Outcome: Progressing

## 2023-08-19 NOTE — ASSESSMENT & PLAN NOTE
· Troponins 312/297/300, flat  · Possible Non MI troponin elevation due to acute CHF  · Appreciate ongoing cardiology recommendations

## 2023-08-19 NOTE — DISCHARGE SUMMARY
[address] [facility]  Discharge- Silas Sow 1950, 67 y.o. male MRN: [id number]  Unit/Bed#: MS [facility]-01 Encounter: [id number]  Primary Care Provider: No primary care provider on file. Date and time admitted to hospital: 8/16/2023 11:11 AM    * Acute combined systolic and diastolic heart failure (HCC)  Assessment & Plan  Wt Readings from Last 3 Encounters:   08/19/23 80.7 kg (178 lb)     · Pt presented to the ED after experiencing shortness of breath with orthopnea. · Found to have slightly elevated troponin, pulmonary congestion on CXR and lower extremity edema. · , trop peak of 312 that down trended   · [facility], [place] queried on care everywhere, awaiting data as no prior ECHOs/labs noted, updated home medication list was faxed by PCP and is updated in chart  · Cardiology following,  · ECHO obtained with EF 30% and G2DD  · Was started on  IV lasix 40 mg BID with improvement. Discharge home on lasix 40mg daily.    · Outpatient follow up with Cardiology      Pleural effusion  Assessment & Plan  · Noted on CXR - trace bilateral pleural effusions noted   · Likely due to CHF  · Likely improved with diuresis  · Currently on RA saturating well     Acute respiratory failure with hypoxia (HCC)  Assessment & Plan  · POA, was requiring up to 3 L NC, now weaned to RA and saturating well   · Likely in setting of acute CHF exacerbation with pleural effusions    Hypokalemia  Assessment & Plan  · Repleted    · Prior mag level WNL    NSVT (nonsustained ventricular tachycardia) ([facility])  Assessment & Plan  · Presumed history per review of cardiology notes  · Continue amiodarone 200 mg BID, mexiletine 250 mg BID (home dose of 200 mg TID, however do not have 200 mg tablets here, cardiology aware) and Toprol XL 25 mg daily     Elevated troponin I level  Assessment & Plan  · Troponins 312/297/300, flat  · Possible Non MI troponin elevation due to acute CHF  · Appreciate ongoing cardiology recommendations     History of automatic internal cardiac defibrillator (AICD)  Assessment & Plan  · Interrogated in the ED, no events per report  · CXR with ICD leads intact   · Secondary to history of ischemic CM  · Continue Toprol XL 25 mg daily, lisinopril increased to 20 mg daily and spironolactone 25 mg daily   · Continue ASA, Brilinta, statin     HTN (hypertension)  Assessment & Plan  · Elevated in the ED, likely due to volume overload, now improving   · Cardiology increased lisinopril to 20 mg daily   · Continue lasix 40 mg QD, Toprol XL 25 mg daily and spironolactone 25 mg daily for now        Medical Problems     Resolved Problems  Date Reviewed: 8/19/2023   None       Discharging Physician / Practitioner: Lori Bautista PA-C  PCP: No primary care provider on file. Admission Date:   Admission Orders (From admission, onward)     Ordered        08/16/23 [address]  Once                      Discharge Date: 08/19/23    Consultations During Hospital Stay:  · Dr. Letty Ramirez    Procedures Performed:     CXR  Pulmonary edema with trace bilateral pleural effusions. ICD leads are intact    Echo  •  Left Ventricle: Left ventricular cavity size is mildly dilated when indexed for BSA. Wall thickness is mildly increased. There is eccentric hypertrophy. The left ventricular ejection fraction is 30%. Systolic function is severely reduced. There is global hypokinesis with specific regional wall abnormalities. Diastolic function is moderately abnormal, consistent with grade II (pseudonormal) relaxation. •  The following segments are akinetic: basal inferior, basal inferolateral, mid inferior, mid inferolateral, apical inferior and apical lateral.  •  Right Ventricle: Right ventricular cavity size is mildly dilated. Systolic function is normal. An AICD wire is present. •  Left Atrium: The atrium is severely dilated (>48 mL/m2). •  Right Atrium: The atrium is mildly dilated.   •  Mitral Valve: Restricted motion of the posterior leaflet. There is mild annular calcification. There is severe regurgitation with a posteriorly directed jet. •  Tricuspid Valve: There is mild regurgitation. The right ventricular systolic pressure is mildly elevated. The estimated right ventricular systolic pressure is 64.44 mmHg. •  Pericardium: There is a left pleural effusion      Significant Findings / Test Results:   · See above    Incidental Findings:   · none     Test Results Pending at Discharge (will require follow up):   · none     Outpatient Tests Requested:  · BMP in 1 week    Complications:  none    Reason for Admission: shortness of breath    Hospital Course:   Zohreh Marrero is a 67 y.o. male patient who originally presented to the hospital on 8/16/2023 due to shortness of breath. Patient was found to be in acute on chronic systolic congestive heart failure. He was seen in consultation by cardiology. He was started on IV Lasix 40 mg twice daily with improvement. He will be discharged on oral Lasix 40 mg daily. Cardiology also increased the patient's lisinopril to 20 mg daily. Patient is visiting from out of town. He will need to follow-up with his doctors in [facility]. Would recommend a BMP in 1 week. Patient be discharged home in stable condition. He is on room air. Please see above list of diagnoses and related plan for additional information. Condition at Discharge: good    Discharge Day Visit / Exam:   Subjective: Patient feeling well. He denies any shortness of breath. Ambulating without difficulty or shortness of breath  Vitals: Blood Pressure: 144/86 (08/19/23 1107)  Pulse: 60 (08/19/23 1107)  Temperature: 97.7 °F (36.5 °C) (08/19/23 1107)  Temp Source: Oral (08/19/23 0747)  Respirations: 18 (08/19/23 1107)  Height: 5' 9" (175.3 cm) (08/16/23 1901)  Weight - Scale: 80.7 kg (178 lb) (08/19/23 0600)  SpO2: 96 % (08/19/23 1107)  Exam:   Physical Exam  Vitals and nursing note reviewed. Constitutional:       General: He is not in acute distress. Appearance: He is well-developed. HENT:      Head: Normocephalic and atraumatic. Eyes:      Conjunctiva/sclera: Conjunctivae normal.   Cardiovascular:      Rate and Rhythm: Normal rate and regular rhythm. Heart sounds: No murmur heard. Pulmonary:      Effort: Pulmonary effort is normal. No respiratory distress. Breath sounds: Normal breath sounds. Abdominal:      Palpations: Abdomen is soft. Tenderness: There is no abdominal tenderness. Musculoskeletal:         General: No swelling. Cervical back: Neck supple. Skin:     General: Skin is warm and dry. Neurological:      Mental Status: He is alert. Psychiatric:         Mood and Affect: Mood normal.          Discussion with Family: Patient declined call to . Discharge instructions/Information to patient and family:   See after visit summary for information provided to patient and family. Provisions for Follow-Up Care:  See after visit summary for information related to follow-up care and any pertinent home health orders. Disposition:   Home    Planned Readmission: none     Discharge Statement:  I spent 45 minutes discharging the patient. This time was spent on the day of discharge. I had direct contact with the patient on the day of discharge. Greater than 50% of the total time was spent examining patient, answering all patient questions, arranging and discussing plan of care with patient as well as directly providing post-discharge instructions. Additional time then spent on discharge activities. Discharge Medications:  See after visit summary for reconciled discharge medications provided to patient and/or family.       **Please Note: This note may have been constructed using a voice recognition system**

## 2023-08-19 NOTE — PLAN OF CARE
Problem: PAIN - ADULT  Goal: Verbalizes/displays adequate comfort level or baseline comfort level  Description: Interventions:  - Encourage patient to monitor pain and request assistance  - Assess pain using appropriate pain scale  - Administer analgesics based on type and severity of pain and evaluate response  - Implement non-pharmacological measures as appropriate and evaluate response  - Consider cultural and social influences on pain and pain management  - Notify physician/advanced practitioner if interventions unsuccessful or patient reports new pain  8/19/2023 1524 by Piedad Bergeron RN  Outcome: Adequate for Discharge     Problem: INFECTION - ADULT  Goal: Absence or prevention of progression during hospitalization  Description: INTERVENTIONS:  - Assess and monitor for signs and symptoms of infection  - Monitor lab/diagnostic results  - Monitor all insertion sites, i.e. indwelling lines, tubes, and drains  - Monitor endotracheal if appropriate and nasal secretions for changes in amount and color  - [place] appropriate cooling/warming therapies per order  - Administer medications as ordered  - Instruct and encourage patient and family to use good hand hygiene technique  - Identify and instruct in appropriate isolation precautions for identified infection/condition  8/19/2023 1524 by Piedad Bergeron RN  Outcome: Adequate for Discharge  Goal: Absence of fever/infection during neutropenic period  Description: INTERVENTIONS:  - Monitor WBC    8/19/2023 1524 by Piedad Bergeron RN  Outcome: Adequate for Discharge    Problem: SAFETY ADULT  Goal: Patient will remain free of falls  Description: INTERVENTIONS:  - Educate patient/family on patient safety including physical limitations  - Instruct patient to call for assistance with activity   - Consult OT/PT to assist with strengthening/mobility   - Keep Call bell within reach  - Keep bed low and locked with side rails adjusted as appropriate  - Keep care items and personal belongings within reach  - Initiate and maintain comfort rounds  - Make Fall Risk Sign visible to staff  - Offer Toileting every  Hours, in advance of need  - Initiate/Maintain alarm  - Obtain necessary fall risk management equipment:   - Apply yellow socks and bracelet for high fall risk patients  - Consider moving patient to room near nurses station  8/19/2023 1524 by Nida Mai RN  Outcome: Adequate for Discharge  Goal: Maintain or return to baseline ADL function  Description: INTERVENTIONS:  -  Assess patient's ability to carry out ADLs; assess patient's baseline for ADL function and identify physical deficits which impact ability to perform ADLs (bathing, care of mouth/teeth, toileting, grooming, dressing, etc.)  - Assess/evaluate cause of self-care deficits   - Assess range of motion  - Assess patient's mobility; develop plan if impaired  - Assess patient's need for assistive devices and provide as appropriate  - Encourage maximum independence but intervene and supervise when necessary  - Involve family in performance of ADLs  - Assess for home care needs following discharge   - Consider OT consult to assist with ADL evaluation and planning for discharge  - Provide patient education as appropriate  8/19/2023 1524 by Nida Mai RN  Outcome: Adequate for Discharge  Goal: Maintains/Returns to pre admission functional level  Description: INTERVENTIONS:  - Perform BMAT or MOVE assessment daily.   - Set and communicate daily mobility goal to care team and patient/family/caregiver. - Collaborate with rehabilitation services on mobility goals if consulted  - Perform Range of Motion  times a day. - Reposition patient every  hours.   - Dangle patient  times a day  - Stand patient  times a day  - Ambulate patient  times a day  - Out of bed to chair  times a day   - Out of bed for meals times a day  - Out of bed for toileting  - Record patient progress and toleration of activity level   8/19/2023 1524 by Rodolfo Orozco RN  Outcome: Adequate for Discharge     Problem: DISCHARGE PLANNING  Goal: Discharge to home or other facility with appropriate resources  Description: INTERVENTIONS:  - Identify barriers to discharge w/patient and caregiver  - Arrange for needed discharge resources and transportation as appropriate  - Identify discharge learning needs (meds, wound care, etc.)  - Arrange for interpretive services to assist at discharge as needed  - Refer to Case Management Department for coordinating discharge planning if the patient needs post-hospital services based on physician/advanced practitioner order or complex needs related to functional status, cognitive ability, or social support system  8/19/2023 1524 by Rodolfo Orozco RN  Outcome: Adequate for Discharge  Problem: Knowledge Deficit  Goal: Patient/family/caregiver demonstrates understanding of disease process, treatment plan, medications, and discharge instructions  Description: Complete learning assessment and assess knowledge base. Interventions:  - Provide teaching at level of understanding  - Provide teaching via preferred learning methods  8/19/2023 1524 by Rodolfo Orozco RN  Outcome: Adequate for Discharge  Problem: Nutrition/Hydration-ADULT  Goal: Nutrient/Hydration intake appropriate for improving, restoring or maintaining nutritional needs  Description: Monitor and assess patient's nutrition/hydration status for malnutrition. Collaborate with interdisciplinary team and initiate plan and interventions as ordered. Monitor patient's weight and dietary intake as ordered or per policy. Utilize nutrition screening tool and intervene as necessary. Determine patient's food preferences and provide high-protein, high-caloric foods as appropriate.      INTERVENTIONS:  - Monitor oral intake, urinary output, labs, and treatment plans  - Assess nutrition and hydration status and recommend course of action  - Evaluate amount of meals eaten  - Assist patient with eating if necessary   - Allow adequate time for meals  - Recommend/ encourage appropriate diets, oral nutritional supplements, and vitamin/mineral supplements  - Order, calculate, and assess calorie counts as needed  - Recommend, monitor, and adjust tube feedings and TPN/PPN based on assessed needs  - Assess need for intravenous fluids  - Provide specific nutrition/hydration education as appropriate  - Include patient/family/caregiver in decisions related to nutrition  8/19/2023 1524 by Marcia Lopez RN  Outcome: Adequate for Discharge  Problem: CARDIOVASCULAR - ADULT  Goal: Maintains optimal cardiac output and hemodynamic stability  Description: INTERVENTIONS:  - Monitor I/O, vital signs and rhythm  - Monitor for S/S and trends of decreased cardiac output  - Administer and titrate ordered vasoactive medications to optimize hemodynamic stability  - Assess quality of pulses, skin color and temperature  - Assess for signs of decreased coronary artery perfusion  - Instruct patient to report change in severity of symptoms  8/19/2023 1524 by Marcia Lopez RN  Outcome: Adequate for Discharge  Goal: Absence of cardiac dysrhythmias or at baseline rhythm  Description: INTERVENTIONS:  - Continuous cardiac monitoring, vital signs, obtain 12 lead EKG if ordered  - Administer antiarrhythmic and heart rate control medications as ordered  - Monitor electrolytes and administer replacement therapy as ordered  8/19/2023 1524 by Marcia Lopez RN  Outcome: Adequate for Discharge

## 2023-08-19 NOTE — ASSESSMENT & PLAN NOTE
· Elevated in the ED, likely due to volume overload, now improving   · Cardiology increased lisinopril to 20 mg daily   · Continue lasix 40 mg QD, Toprol XL 25 mg daily and spironolactone 25 mg daily for now

## 2023-08-19 NOTE — ASSESSMENT & PLAN NOTE
· Noted on CXR - trace bilateral pleural effusions noted   · Likely due to CHF  · Likely improved with diuresis  · Currently on RA saturating well

## 2023-08-19 NOTE — PROGRESS NOTES
General Cardiology Progress Note   Tae Craven 67 y.o. 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 No device therapies  • Coronary artery disease-history of myocardial infarction, on Brilinta, aspirin, atorvastatin, metoprolol, but still no records from [facility].  • Ischemic cardiomyopathy, EF is 25 to 30%. 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 Plan:    • All symptoms have resolved  • He is euvolemic. • Renal function remains normal  • Weight is down to 178 pounds. • He is stable for discharge home. • He needs to maintain a low-sodium diet, 50 ounces fluid restriction, 100% compliance with medical therapy, and his current med list is appropriate for discharge. • Follow-up with [facility] when he returns home, he reports he has an ointment next Friday. Subjective:   Patient seen and examined. No shortness of breath, ambulating well, no edema, no orthopnea. Review of Systems   Constitutional: Negative for diaphoresis, fever, malaise/fatigue and night sweats. Cardiovascular: Negative for chest pain, dyspnea on exertion, leg swelling, orthopnea, palpitations and syncope. Respiratory: Negative for cough, shortness of breath, sputum production and wheezing. Skin: Negative for itching and rash.    Gastrointestinal: Negative for abdominal pain, change in bowel habit and diarrhea. Neurological: Negative for dizziness, focal weakness, light-headedness and weakness. Objective   Vitals: Blood pressure 144/86, pulse 60, temperature 97.7 °F (36.5 °C), resp. rate 18, height 5' 9" (1.753 m), weight 80.7 kg (178 lb), SpO2 96 %. , Body mass index is 26.29 kg/m²., I/O last 3 completed shifts: In: 900 [P.O.:900]  Out: 1575 [Urine:1575]  I/O this shift:  In: 360 [P.O.:360]  Out: 525 [Urine:525]  Wt Readings from Last 3 Encounters:   08/19/23 80.7 kg (178 lb)       Intake/Output Summary (Last 24 hours) at 8/19/2023 1158  Last data filed at 8/19/2023 0900  Gross per 24 hour   Intake 840 ml   Output 1000 ml   Net -160 ml     I/O last 3 completed shifts: In: 900 [P.O.:900]  Out: 6472 [Urine:1575]    No significant arrhythmias seen on telemetry review. Physical Exam  Constitutional:       General: He is not in acute distress. Appearance: He is not diaphoretic. HENT:      Head: Normocephalic and atraumatic. Neck:      Vascular: No JVD. Cardiovascular:      Rate and Rhythm: Normal rate and regular rhythm. Heart sounds: Normal heart sounds. No murmur heard. Pulmonary:      Effort: Pulmonary effort is normal. No respiratory distress. Breath sounds: Normal breath sounds. No wheezing or rales. Abdominal:      General: Bowel sounds are normal. There is no distension. Palpations: Abdomen is soft. Tenderness: There is no abdominal tenderness. Musculoskeletal:      Cervical back: Normal range of motion and neck supple. Right lower leg: No edema. Left lower leg: No edema. Skin:     General: Skin is warm and dry. Neurological:      Mental Status: He is alert and oriented to person, place, and time.          Meds/Allergies   No Known Allergies    Current Facility-Administered Medications:   •  acetaminophen (TYLENOL) tablet 650 mg, 650 mg, Oral, Q4H PRN, Wai Oneal MD  •  amiodarone tablet 200 mg, 200 mg, Oral, BID, Wai Oneal MD, 200 mg at 08/19/23 0836  •  aspirin chewable tablet 81 mg, 81 mg, Oral, Daily, Zandra Mac MD, 81 mg at 08/19/23 0836  •  atorvastatin (LIPITOR) tablet 40 mg, 40 mg, Oral, Daily, Zandra Mac MD, 40 mg at 08/19/23 0836  •  calcium carbonate (TUMS) chewable tablet 1,000 mg, 1,000 mg, Oral, Daily PRN, Zandra Mac MD  •  enoxaparin (LOVENOX) subcutaneous injection 40 mg, 40 mg, Subcutaneous, Daily, Zandra Mac MD, 40 mg at 08/19/23 0836  •  furosemide (LASIX) injection 40 mg, 40 mg, Intravenous, BID, Zandra Mac MD, 40 mg at 08/19/23 0836  •  lisinopril (ZESTRIL) tablet 20 mg, 20 mg, Oral, Daily, Lizzie Walker MD, 20 mg at 08/19/23 0836  •  metoprolol succinate (TOPROL-XL) 24 hr tablet 25 mg, 25 mg, Oral, Daily, Zandra Mac MD, 25 mg at 08/19/23 0836  •  mexiletine (MEXITIL) capsule 250 mg, 250 mg, Oral, Q12H [facility], Zandra Mac MD, 250 mg at 08/19/23 0837  •  ondansetron (ZOFRAN) injection 4 mg, 4 mg, Intravenous, Q6H PRN, Zandra Mac MD  •  pantoprazole (PROTONIX) EC tablet 40 mg, 40 mg, Oral, Early Morning, Zandra Mac MD, 40 mg at 08/19/23 8655  •  potassium chloride (K-DUR,KLOR-CON) CR tablet 40 mEq, 40 mEq, Oral, BID, Ignacia Lawrence PA-C, 40 mEq at 08/19/23 4328  •  spironolactone (ALDACTONE) tablet 25 mg, 25 mg, Oral, Daily, Zandra Mac MD, 25 mg at 08/19/23 0836  •  ticagrelor (BRILINTA) tablet 90 mg, 90 mg, Oral, Q12H [facility], Zandra Mac MD, 90 mg at 08/19/23 0837    Laboratory Results:        CBC with diff:   Results from last 7 days   Lab Units 08/17/23  0507 08/16/23  1134   WBC Thousand/uL 5.19 6.14   HEMOGLOBIN g/dL 13.0 13.5   HEMATOCRIT % 41.3 42.0   MCV fL 84 84   PLATELETS Thousands/uL 160 241   RBC Million/uL 4.93 4.99   MCH pg 26.4* 27.1   MCHC g/dL 31.5 32.1   RDW % 16.1* 16.2*   MPV fL 9.8 11.1   NRBC AUTO /100 WBCs  --  0       CMP:  Results from last 7 days   Lab Units 08/19/23  0503 08/18/23  0448 08/17/23  0507 08/16/23  1755 08/16/23  1134   SODIUM mmol/L 142 141 142 142 138 POTASSIUM mmol/L 3.3* 3.6 3.4* 3.3* 4.8   CHLORIDE mmol/L 106 107 109* 112* 109*   CO2 mmol/L 30 32 29 26 25   BUN mg/dL 24 24 21 21 25   CREATININE mg/dL 1.02 1.08 0.93 0.97 1.11   CALCIUM mg/dL 8.7 9.0 8.7 8.7 9.2   AST U/L  --   --   --   --  57*   ALT U/L  --   --   --   --  47   ALK PHOS U/L  --   --   --   --  89   EGFR ml/min/1.73sq m 72 67 81 77 65       BMP:  Results from last 7 days   Lab Units 08/19/23  0503 08/18/23  0448 08/17/23  0507 08/16/23  1755 08/16/23  1134   SODIUM mmol/L 142 141 142 142 138   POTASSIUM mmol/L 3.3* 3.6 3.4* 3.3* 4.8   CHLORIDE mmol/L 106 107 109* 112* 109*   CO2 mmol/L 30 32 29 26 25   BUN mg/dL 24 24 21 21 25   CREATININE mg/dL 1.02 1.08 0.93 0.97 1.11   CALCIUM mg/dL 8.7 9.0 8.7 8.7 9.2       NT-proBNP: No results for input(s): "NTBNP" in the last 72 hours. Magnesium:   Results from last 7 days   Lab Units 08/16/23  1134   MAGNESIUM mg/dL 2.5       Coags:       TSH:        Hemoglobin A1C )      Lipid Profile:   No results found for: "CHOL"  No results found for: "HDL"  No results found for: "LDLCALC"  No results found for: "LDLDIRECT"  No results found for: "TRIG"    Cardiac testing:   EKG personally reviewed by Demetrius Gregg MD.     Cath:  ECHO:  Stress TEST:  Other:        Demetrius Gregg MD    Portions of the record may have been created with voice recognition software. Occasional wrong word or "sound a like" substitutions may have occurred due to the inherent limitations of voice recognition software. Read the chart carefully and recognize, using context, where substitutions have occurred.

## 2023-08-19 NOTE — ASSESSMENT & PLAN NOTE
Wt Readings from Last 3 Encounters:   08/19/23 80.7 kg (178 lb)     · Pt presented to the ED after experiencing shortness of breath with orthopnea. · Found to have slightly elevated troponin, pulmonary congestion on CXR and lower extremity edema. · , trop peak of 312 that down trended   · [facility] queried on care everywhere, awaiting data as no prior ECHOs/labs noted, updated home medication list was faxed by PCP and is updated in chart  · Cardiology following,  · ECHO obtained with EF 30% and G2DD  · Was started on  IV lasix 40 mg BID with improvement. Discharge home on lasix 40mg daily.    · Outpatient follow up with Cardiology

## 2023-08-21 NOTE — UTILIZATION REVIEW
NOTIFICATION OF ADMISSION DISCHARGE   This is a Notification of Discharge from [facility]. Please be advised that this patient has been discharge from our facility. Below you will find the admission and discharge date and time including the patient’s disposition. UTILIZATION REVIEW CONTACT:  Cecil Zeng  Utilization   Network Utilization Review Department  Phone: [phone] x carefully listen to the prompts. All voicemails are confidential.  Email: [email or web address]     ADMISSION INFORMATION  PRESENTATION DATE: 8/16/2023 11:11 AM  OBERVATION ADMISSION DATE:   INPATIENT ADMISSION DATE: 8/16/23  1:27 PM   DISCHARGE DATE: 8/19/2023  3:47 PM   DISPOSITION:Home/Self Care    IMPORTANT INFORMATION:  Send all requests for admission clinical reviews, approved or denied determinations and any other requests to dedicated fax number below belonging to the campus where the patient is receiving treatment.  List of dedicated fax numbers:  [place] DENIALS (Administrative/Medical Necessity) [phone] [address] [facility] (Maternity/NICU/Pediatrics) [phone]   [facility] [phone]   [facility] [phone]   [address] [facility] [address] [phone]   [address] [place] [address] [phone]   [facility] [phone]   [address] [place] [address] [address] [facility] [phone]   [address] [facility] [phone]   [address] [facility] [phone]   [address] [facility] [address] [facility] [phone]